# Patient Record
Sex: FEMALE | Race: WHITE | NOT HISPANIC OR LATINO | Employment: FULL TIME | URBAN - METROPOLITAN AREA
[De-identification: names, ages, dates, MRNs, and addresses within clinical notes are randomized per-mention and may not be internally consistent; named-entity substitution may affect disease eponyms.]

---

## 2024-05-24 ENCOUNTER — APPOINTMENT (OUTPATIENT)
Dept: CARDIOLOGY | Facility: HOSPITAL | Age: 29
DRG: 885 | End: 2024-05-24
Payer: COMMERCIAL

## 2024-05-24 ENCOUNTER — HOSPITAL ENCOUNTER (EMERGENCY)
Facility: HOSPITAL | Age: 29
Discharge: PSYCHIATRIC OR SUBSTANCE USE FACILITY | End: 2024-05-25
Attending: STUDENT IN AN ORGANIZED HEALTH CARE EDUCATION/TRAINING PROGRAM
Payer: COMMERCIAL

## 2024-05-24 DIAGNOSIS — R45.89 SUICIDAL BEHAVIOR WITHOUT ATTEMPTED SELF-INJURY: Primary | ICD-10-CM

## 2024-05-24 LAB
ALBUMIN SERPL BCP-MCNC: 4.6 G/DL (ref 3.4–5)
ALP SERPL-CCNC: 32 U/L (ref 33–110)
ALT SERPL W P-5'-P-CCNC: 9 U/L (ref 7–45)
AMPHETAMINES UR QL SCN: NORMAL
ANION GAP SERPL CALC-SCNC: 14 MMOL/L (ref 10–20)
APAP SERPL-MCNC: <10 UG/ML
APPEARANCE UR: CLEAR
AST SERPL W P-5'-P-CCNC: 11 U/L (ref 9–39)
BARBITURATES UR QL SCN: NORMAL
BASOPHILS # BLD AUTO: 0.03 X10*3/UL (ref 0–0.1)
BASOPHILS NFR BLD AUTO: 0.4 %
BENZODIAZ UR QL SCN: NORMAL
BILIRUB SERPL-MCNC: 0.4 MG/DL (ref 0–1.2)
BILIRUB UR STRIP.AUTO-MCNC: NEGATIVE MG/DL
BUN SERPL-MCNC: 11 MG/DL (ref 6–23)
BZE UR QL SCN: NORMAL
CALCIUM SERPL-MCNC: 9.5 MG/DL (ref 8.6–10.3)
CANNABINOIDS UR QL SCN: NORMAL
CHLORIDE SERPL-SCNC: 106 MMOL/L (ref 98–107)
CO2 SERPL-SCNC: 26 MMOL/L (ref 21–32)
COLOR UR: NORMAL
CREAT SERPL-MCNC: 0.97 MG/DL (ref 0.5–1.05)
EGFRCR SERPLBLD CKD-EPI 2021: 81 ML/MIN/1.73M*2
EOSINOPHIL # BLD AUTO: 0.13 X10*3/UL (ref 0–0.7)
EOSINOPHIL NFR BLD AUTO: 1.9 %
ERYTHROCYTE [DISTWIDTH] IN BLOOD BY AUTOMATED COUNT: 12.2 % (ref 11.5–14.5)
ETHANOL SERPL-MCNC: <10 MG/DL
FENTANYL+NORFENTANYL UR QL SCN: NORMAL
GLUCOSE SERPL-MCNC: 100 MG/DL (ref 74–99)
GLUCOSE UR STRIP.AUTO-MCNC: NORMAL MG/DL
HCG UR QL IA.RAPID: NEGATIVE
HCT VFR BLD AUTO: 43.1 % (ref 36–46)
HGB BLD-MCNC: 14.5 G/DL (ref 12–16)
IMM GRANULOCYTES # BLD AUTO: 0.01 X10*3/UL (ref 0–0.7)
IMM GRANULOCYTES NFR BLD AUTO: 0.1 % (ref 0–0.9)
KETONES UR STRIP.AUTO-MCNC: NEGATIVE MG/DL
LEUKOCYTE ESTERASE UR QL STRIP.AUTO: NEGATIVE
LYMPHOCYTES # BLD AUTO: 1.66 X10*3/UL (ref 1.2–4.8)
LYMPHOCYTES NFR BLD AUTO: 24.7 %
MCH RBC QN AUTO: 30.6 PG (ref 26–34)
MCHC RBC AUTO-ENTMCNC: 33.6 G/DL (ref 32–36)
MCV RBC AUTO: 91 FL (ref 80–100)
METHADONE UR QL SCN: NORMAL
MONOCYTES # BLD AUTO: 0.51 X10*3/UL (ref 0.1–1)
MONOCYTES NFR BLD AUTO: 7.6 %
NEUTROPHILS # BLD AUTO: 4.38 X10*3/UL (ref 1.2–7.7)
NEUTROPHILS NFR BLD AUTO: 65.3 %
NITRITE UR QL STRIP.AUTO: NEGATIVE
NRBC BLD-RTO: 0 /100 WBCS (ref 0–0)
OPIATES UR QL SCN: NORMAL
OXYCODONE+OXYMORPHONE UR QL SCN: NORMAL
PCP UR QL SCN: NORMAL
PH UR STRIP.AUTO: 6.5 [PH]
PLATELET # BLD AUTO: 223 X10*3/UL (ref 150–450)
POTASSIUM SERPL-SCNC: 3.5 MMOL/L (ref 3.5–5.3)
PROT SERPL-MCNC: 7.2 G/DL (ref 6.4–8.2)
PROT UR STRIP.AUTO-MCNC: NEGATIVE MG/DL
RBC # BLD AUTO: 4.74 X10*6/UL (ref 4–5.2)
RBC # UR STRIP.AUTO: NEGATIVE /UL
SALICYLATES SERPL-MCNC: <3 MG/DL
SODIUM SERPL-SCNC: 142 MMOL/L (ref 136–145)
SP GR UR STRIP.AUTO: 1.02
TSH SERPL-ACNC: 0.5 MIU/L (ref 0.44–3.98)
UROBILINOGEN UR STRIP.AUTO-MCNC: NORMAL MG/DL
WBC # BLD AUTO: 6.7 X10*3/UL (ref 4.4–11.3)

## 2024-05-24 PROCEDURE — 85025 COMPLETE CBC W/AUTO DIFF WBC: CPT | Performed by: PHYSICIAN ASSISTANT

## 2024-05-24 PROCEDURE — 84443 ASSAY THYROID STIM HORMONE: CPT | Performed by: PHYSICIAN ASSISTANT

## 2024-05-24 PROCEDURE — 80307 DRUG TEST PRSMV CHEM ANLYZR: CPT | Performed by: PHYSICIAN ASSISTANT

## 2024-05-24 PROCEDURE — 81003 URINALYSIS AUTO W/O SCOPE: CPT | Mod: 59 | Performed by: PHYSICIAN ASSISTANT

## 2024-05-24 PROCEDURE — 93005 ELECTROCARDIOGRAM TRACING: CPT

## 2024-05-24 PROCEDURE — 36415 COLL VENOUS BLD VENIPUNCTURE: CPT | Performed by: PHYSICIAN ASSISTANT

## 2024-05-24 PROCEDURE — 80320 DRUG SCREEN QUANTALCOHOLS: CPT | Performed by: PHYSICIAN ASSISTANT

## 2024-05-24 PROCEDURE — 99285 EMERGENCY DEPT VISIT HI MDM: CPT

## 2024-05-24 PROCEDURE — 80053 COMPREHEN METABOLIC PANEL: CPT | Performed by: PHYSICIAN ASSISTANT

## 2024-05-24 PROCEDURE — 81025 URINE PREGNANCY TEST: CPT | Performed by: PHYSICIAN ASSISTANT

## 2024-05-24 SDOH — HEALTH STABILITY: MENTAL HEALTH: WISH TO BE DEAD (PAST 1 MONTH): YES

## 2024-05-24 SDOH — HEALTH STABILITY: MENTAL HEALTH: ACTIVE SUICIDAL IDEATION WITH SPECIFIC PLAN AND INTENT (PAST 1 MONTH): NO

## 2024-05-24 SDOH — HEALTH STABILITY: MENTAL HEALTH: ANXIETY SYMPTOMS: GENERALIZED

## 2024-05-24 SDOH — HEALTH STABILITY: MENTAL HEALTH: SUICIDAL BEHAVIOR (LIFETIME): YES

## 2024-05-24 SDOH — HEALTH STABILITY: MENTAL HEALTH: ARE YOU HAVING THOUGHTS OF KILLING YOURSELF RIGHT NOW?: NO

## 2024-05-24 SDOH — HEALTH STABILITY: MENTAL HEALTH: IN THE PAST FEW WEEKS, HAVE YOU WISHED YOU WERE DEAD?: YES

## 2024-05-24 SDOH — HEALTH STABILITY: MENTAL HEALTH: HAVE YOU BEEN THINKING ABOUT HOW YOU MIGHT DO THIS?: YES

## 2024-05-24 SDOH — HEALTH STABILITY: MENTAL HEALTH: HAVE YOU HAD THESE THOUGHTS AND HAD SOME INTENTION OF ACTING ON THEM?: NO

## 2024-05-24 SDOH — HEALTH STABILITY: MENTAL HEALTH: SUICIDAL BEHAVIOR (3 MONTHS): YES

## 2024-05-24 SDOH — HEALTH STABILITY: MENTAL HEALTH: NEEDS EXPRESSED: EMOTIONAL

## 2024-05-24 SDOH — HEALTH STABILITY: MENTAL HEALTH: HAVE YOU ACTUALLY HAD ANY THOUGHTS OF KILLING YOURSELF?: YES

## 2024-05-24 SDOH — HEALTH STABILITY: MENTAL HEALTH: NON-SPECIFIC ACTIVE SUICIDAL THOUGHTS (PAST 1 MONTH): YES

## 2024-05-24 SDOH — HEALTH STABILITY: MENTAL HEALTH: HAVE YOU EVER TRIED TO KILL YOURSELF?: NO

## 2024-05-24 SDOH — HEALTH STABILITY: MENTAL HEALTH: SUICIDE ASSESSMENT: ADULT (C-SSRS)

## 2024-05-24 SDOH — ECONOMIC STABILITY: HOUSING INSECURITY: FEELS SAFE LIVING IN HOME: YES

## 2024-05-24 SDOH — HEALTH STABILITY: MENTAL HEALTH: HAVE YOU EVER DONE ANYTHING, STARTED TO DO ANYTHING, OR PREPARED TO DO ANYTHING TO END YOUR LIFE?: NO

## 2024-05-24 SDOH — HEALTH STABILITY: MENTAL HEALTH
DEPRESSION SYMPTOMS: APPETITE CHANGE;CHANGE IN ENERGY LEVEL;FEELINGS OF HELPLESSNESS;FEELINGS OF HOPELESSESS;FEELINGS OF WORTHLESSNESS;IMPAIRED CONCENTRATION;LOSS OF INTEREST

## 2024-05-24 SDOH — HEALTH STABILITY: MENTAL HEALTH: IN THE PAST FEW WEEKS, HAVE YOU FELT THAT YOU OR YOUR FAMILY WOULD BE BETTER OFF IF YOU WERE DEAD?: YES

## 2024-05-24 SDOH — HEALTH STABILITY: MENTAL HEALTH
HAVE YOU STARTED TO WORK OUT OR WORKED OUT THE DETAILS OF HOW TO KILL YOURSELF? DO YOU INTENT TO CARRY OUT THIS PLAN?: NO

## 2024-05-24 SDOH — HEALTH STABILITY: MENTAL HEALTH: ACTIVE SUICIDAL IDEATION WITH SOME INTENT TO ACT, WITHOUT SPECIFIC PLAN (PAST 1 MONTH): NO

## 2024-05-24 SDOH — HEALTH STABILITY: MENTAL HEALTH: IN THE PAST WEEK, HAVE YOU BEEN HAVING THOUGHTS ABOUT KILLING YOURSELF?: YES

## 2024-05-24 SDOH — HEALTH STABILITY: MENTAL HEALTH
SUICIDAL BEHAVIOR (DESCRIPTION): PATIENT REPORTED THAT SHE TOOK TOO MANY TABLETS OF HER MEDICATION "BECAUSE I WANTED TO SLEEP"

## 2024-05-24 SDOH — HEALTH STABILITY: MENTAL HEALTH: HAVE YOU WISHED YOU WERE DEAD OR WISHED YOU COULD GO TO SLEEP AND NOT WAKE UP?: YES

## 2024-05-24 SDOH — SOCIAL STABILITY: SOCIAL NETWORK: PARENT/GUARDIAN/SIGNIFICANT OTHER INVOLVEMENT: ATTENTIVE TO PATIENT NEEDS

## 2024-05-24 ASSESSMENT — LIFESTYLE VARIABLES
PRESCIPTION_ABUSE_PAST_12_MONTHS: NO
SUBSTANCE_ABUSE_PAST_12_MONTHS: NO

## 2024-05-24 ASSESSMENT — PAIN SCALES - GENERAL: PAINLEVEL_OUTOF10: 0 - NO PAIN

## 2024-05-24 ASSESSMENT — PAIN - FUNCTIONAL ASSESSMENT: PAIN_FUNCTIONAL_ASSESSMENT: 0-10

## 2024-05-24 NOTE — ED PROVIDER NOTES
HPI   Chief Complaint   Patient presents with    Psychiatric Evaluation     Patient with history of psychiatric placement (placed in [place] for 10 days back in April) reports depression and thoughts of suicide that she has had before but have increased in the past week. No attempts recently. Accidental overdose was 2 months ago. No plan to kill herself but has identified a method, which would be an overdose with her lexapro/risperidone. Diagnosed with depression, anxiety, OCD. Possible Borderline PD. Patient has psychiatrist in [place]. Denies self-harm.       This is a 28-year-old female with PMH anxiety depression presenting for evaluation of depression and suicidal thoughts.  States she is in town visiting family.  Had a psychiatric hospitalization in [place] in April.  She reports passive suicidal thoughts of wishing she would not be around anymore and occasionally considers overdosing on her psychiatric medications but states she has no definitive plan to do so.  She has accidentally overdosed in the past when she took more medicine she was post to because she was trying to go to sleep.  She uses sleep as a way to escape being awake she says.  Denies any current plans for self-harm.  Denies HI.  Denies auditory visual hallucinations.      History provided by:  Patient   used: No                        [place] Coma Scale Score: 15                     Patient History   No past medical history on file.  No past surgical history on file.  No family history on file.  Social History     Tobacco Use    Smoking status: Not on file    Smokeless tobacco: Not on file   Substance Use Topics    Alcohol use: Not on file    Drug use: Not on file       Physical Exam   ED Triage Vitals [05/24/24 1620]   Temperature Heart Rate Respirations BP   36.8 °C (98.2 °F) 83 16 105/71      Pulse Ox Temp Source Heart Rate Source Patient Position   98 % Temporal Monitor Sitting      BP Location FiO2 (%)     Left arm  --       Physical Exam  Constitutional:       Appearance: Normal appearance.   Eyes:      Extraocular Movements: Extraocular movements intact.      Pupils: Pupils are equal, round, and reactive to light.   Cardiovascular:      Rate and Rhythm: Normal rate and regular rhythm.      Pulses: Normal pulses.      Heart sounds: Normal heart sounds.   Pulmonary:      Effort: Pulmonary effort is normal.      Breath sounds: Normal breath sounds.   Abdominal:      Palpations: Abdomen is soft.      Tenderness: There is no abdominal tenderness.   Neurological:      Mental Status: She is alert and oriented to person, place, and time.   Psychiatric:         Mood and Affect: Mood normal.         Behavior: Behavior normal.         ED Course & MDM        Medical Decision Making  Patient is currently calm and cooperative and not acutely psychotic.  Currently medically cleared, there is no medical contraindication to psychiatric evaluation at this time.  Pending EPAT evaluation.  This visit was staffed with the attending physician Dr. Mcintyre.      Disclaimer: This note was dictated using speech recognition software. An attempt at proofreading was made to minimize errors. Minor errors in transcription may be present. Please call if questions.    Amount and/or Complexity of Data Reviewed  Labs: ordered.  ECG/medicine tests: ordered and independent interpretation performed.     Details: EKG interpreted by me: Normal sinus rhythm with sinus arrhythmia.  Rate 65.  Normal axis.  QTc 411 ms.  No acute T wave changes.  No STEMI.        Procedure  Procedures     Rojelio Urbina PA-C  05/24/24 6023

## 2024-05-24 NOTE — ED TRIAGE NOTES
Patient with history of psychiatric placement (placed in [place] for 10 days back in April) reports depression and thoughts of suicide that she has had before but have increased in the past week. No attempts recently. Accidental overdose was 2 months ago. No plan to kill herself but has identified a method, which would be an overdose with her lexapro/risperidone. Diagnosed with depression, anxiety, OCD. Possible Borderline PD. Patient has psychiatrist in [place]. Denies self-harm. Currently staying with parents, mother brought her here today.

## 2024-05-25 ENCOUNTER — HOSPITAL ENCOUNTER (INPATIENT)
Facility: HOSPITAL | Age: 29
LOS: 16 days | Discharge: HOME | DRG: 885 | End: 2024-06-10
Attending: PSYCHIATRY & NEUROLOGY | Admitting: PSYCHIATRY & NEUROLOGY
Payer: COMMERCIAL

## 2024-05-25 VITALS
HEIGHT: 70 IN | SYSTOLIC BLOOD PRESSURE: 101 MMHG | BODY MASS INDEX: 22.9 KG/M2 | TEMPERATURE: 97.3 F | HEART RATE: 62 BPM | OXYGEN SATURATION: 98 % | WEIGHT: 160 LBS | RESPIRATION RATE: 17 BRPM | DIASTOLIC BLOOD PRESSURE: 67 MMHG

## 2024-05-25 DIAGNOSIS — F33.2 SEVERE EPISODE OF RECURRENT MAJOR DEPRESSIVE DISORDER, WITHOUT PSYCHOTIC FEATURES (MULTI): Primary | ICD-10-CM

## 2024-05-25 PROBLEM — F33.9 MAJOR DEPRESSIVE DISORDER, RECURRENT (CMS-HCC): Status: ACTIVE | Noted: 2024-05-25

## 2024-05-25 LAB
CHOLEST SERPL-MCNC: 165 MG/DL (ref 0–199)
CHOLESTEROL/HDL RATIO: 4.2
FLUAV RNA RESP QL NAA+PROBE: NOT DETECTED
FLUBV RNA RESP QL NAA+PROBE: NOT DETECTED
GLUCOSE P FAST SERPL-MCNC: 84 MG/DL (ref 74–99)
HDLC SERPL-MCNC: 39 MG/DL
HOLD SPECIMEN: NORMAL
LDLC SERPL CALC-MCNC: 107 MG/DL
NON HDL CHOLESTEROL: 126 MG/DL (ref 0–149)
SARS-COV-2 RNA RESP QL NAA+PROBE: NOT DETECTED
TRIGL SERPL-MCNC: 96 MG/DL (ref 0–149)
VLDL: 19 MG/DL (ref 0–40)

## 2024-05-25 PROCEDURE — 82947 ASSAY GLUCOSE BLOOD QUANT: CPT | Performed by: PSYCHIATRY & NEUROLOGY

## 2024-05-25 PROCEDURE — 36415 COLL VENOUS BLD VENIPUNCTURE: CPT | Performed by: PSYCHIATRY & NEUROLOGY

## 2024-05-25 PROCEDURE — 2500000001 HC RX 250 WO HCPCS SELF ADMINISTERED DRUGS (ALT 637 FOR MEDICARE OP): Performed by: PSYCHIATRY & NEUROLOGY

## 2024-05-25 PROCEDURE — 2500000002 HC RX 250 W HCPCS SELF ADMINISTERED DRUGS (ALT 637 FOR MEDICARE OP, ALT 636 FOR OP/ED): Performed by: PSYCHIATRY & NEUROLOGY

## 2024-05-25 PROCEDURE — 87636 SARSCOV2 & INF A&B AMP PRB: CPT | Performed by: STUDENT IN AN ORGANIZED HEALTH CARE EDUCATION/TRAINING PROGRAM

## 2024-05-25 PROCEDURE — 80061 LIPID PANEL: CPT | Performed by: PSYCHIATRY & NEUROLOGY

## 2024-05-25 PROCEDURE — 1240000001 HC SEMI-PRIVATE BH ROOM DAILY

## 2024-05-25 PROCEDURE — 99221 1ST HOSP IP/OBS SF/LOW 40: CPT | Performed by: NURSE PRACTITIONER

## 2024-05-25 PROCEDURE — 2500000001 HC RX 250 WO HCPCS SELF ADMINISTERED DRUGS (ALT 637 FOR MEDICARE OP): Performed by: NURSE PRACTITIONER

## 2024-05-25 PROCEDURE — 99223 1ST HOSP IP/OBS HIGH 75: CPT | Performed by: PSYCHIATRY & NEUROLOGY

## 2024-05-25 RX ORDER — ESCITALOPRAM OXALATE 20 MG/1
40 TABLET ORAL DAILY
COMMUNITY
End: 2024-06-10 | Stop reason: HOSPADM

## 2024-05-25 RX ORDER — CLONIDINE HYDROCHLORIDE 0.1 MG/1
0.1 TABLET ORAL NIGHTLY
COMMUNITY
End: 2024-06-10 | Stop reason: HOSPADM

## 2024-05-25 RX ORDER — NORETHINDRONE ACETATE AND ETHINYL ESTRADIOL 1MG-20(21)
1 KIT ORAL DAILY
COMMUNITY

## 2024-05-25 RX ORDER — OLANZAPINE 5 MG/1
5 TABLET ORAL NIGHTLY
Status: DISCONTINUED | OUTPATIENT
Start: 2024-05-25 | End: 2024-05-30

## 2024-05-25 RX ORDER — FLUVOXAMINE MALEATE 50 MG/1
25 TABLET, FILM COATED ORAL 2 TIMES DAILY
Status: DISCONTINUED | OUTPATIENT
Start: 2024-05-25 | End: 2024-05-30

## 2024-05-25 RX ORDER — TRAZODONE HYDROCHLORIDE 50 MG/1
50 TABLET ORAL NIGHTLY
COMMUNITY
End: 2024-06-10 | Stop reason: HOSPADM

## 2024-05-25 RX ORDER — MIRTAZAPINE 15 MG/1
15 TABLET, FILM COATED ORAL NIGHTLY
COMMUNITY
End: 2024-06-10 | Stop reason: HOSPADM

## 2024-05-25 RX ORDER — ACETAMINOPHEN 325 MG/1
650 TABLET ORAL EVERY 4 HOURS PRN
Status: DISCONTINUED | OUTPATIENT
Start: 2024-05-25 | End: 2024-06-10 | Stop reason: HOSPADM

## 2024-05-25 RX ORDER — RISPERIDONE 0.5 MG/1
0.5 TABLET ORAL 2 TIMES DAILY
COMMUNITY
End: 2024-06-10 | Stop reason: HOSPADM

## 2024-05-25 RX ORDER — BACITRACIN ZINC 500 UNIT/G
1 OINTMENT IN PACKET (EA) TOPICAL 3 TIMES DAILY
Status: DISCONTINUED | OUTPATIENT
Start: 2024-05-25 | End: 2024-06-10 | Stop reason: HOSPADM

## 2024-05-25 RX ORDER — LORAZEPAM 1 MG/1
1 TABLET ORAL EVERY 6 HOURS PRN
Status: DISCONTINUED | OUTPATIENT
Start: 2024-05-25 | End: 2024-05-29

## 2024-05-25 RX ORDER — NORETHINDRONE ACETATE AND ETHINYL ESTRADIOL 1MG-20(21)
1 KIT ORAL DAILY
Status: DISCONTINUED | OUTPATIENT
Start: 2024-05-25 | End: 2024-06-10 | Stop reason: HOSPADM

## 2024-05-25 RX ORDER — DOCUSATE SODIUM 100 MG/1
100 CAPSULE, LIQUID FILLED ORAL 2 TIMES DAILY PRN
Status: DISCONTINUED | OUTPATIENT
Start: 2024-05-25 | End: 2024-06-10 | Stop reason: HOSPADM

## 2024-05-25 RX ADMIN — ACETAMINOPHEN 650 MG: 325 TABLET ORAL at 20:24

## 2024-05-25 RX ADMIN — BACITRACIN 1 APPLICATION: 500 OINTMENT TOPICAL at 20:24

## 2024-05-25 RX ADMIN — BACITRACIN 1 APPLICATION: 500 OINTMENT TOPICAL at 17:24

## 2024-05-25 RX ADMIN — OLANZAPINE 5 MG: 5 TABLET, FILM COATED ORAL at 20:24

## 2024-05-25 RX ADMIN — FLUVOXAMINE MALEATE 25 MG: 50 TABLET ORAL at 14:59

## 2024-05-25 RX ADMIN — FLUVOXAMINE MALEATE 25 MG: 50 TABLET ORAL at 20:24

## 2024-05-25 SDOH — SOCIAL STABILITY: SOCIAL INSECURITY: ABUSE: ADULT

## 2024-05-25 SDOH — SOCIAL STABILITY: SOCIAL INSECURITY: ARE THERE ANY APPARENT SIGNS OF INJURIES/BEHAVIORS THAT COULD BE RELATED TO ABUSE/NEGLECT?: NO

## 2024-05-25 SDOH — SOCIAL STABILITY: SOCIAL INSECURITY: DO YOU FEEL UNSAFE GOING BACK TO THE PLACE WHERE YOU ARE LIVING?: NO

## 2024-05-25 SDOH — SOCIAL STABILITY: SOCIAL INSECURITY: HAS ANYONE EVER THREATENED TO HURT YOUR FAMILY OR YOUR PETS?: NO

## 2024-05-25 SDOH — SOCIAL STABILITY: SOCIAL INSECURITY: HAVE YOU HAD ANY THOUGHTS OF HARMING ANYONE ELSE?: NO

## 2024-05-25 SDOH — SOCIAL STABILITY: SOCIAL INSECURITY: HAVE YOU HAD THOUGHTS OF HARMING ANYONE ELSE?: NO

## 2024-05-25 SDOH — SOCIAL STABILITY: SOCIAL INSECURITY: DOES ANYONE TRY TO KEEP YOU FROM HAVING/CONTACTING OTHER FRIENDS OR DOING THINGS OUTSIDE YOUR HOME?: NO

## 2024-05-25 SDOH — SOCIAL STABILITY: SOCIAL INSECURITY: WERE YOU ABLE TO COMPLETE ALL THE BEHAVIORAL HEALTH SCREENINGS?: YES

## 2024-05-25 SDOH — SOCIAL STABILITY: SOCIAL INSECURITY: ARE YOU OR HAVE YOU BEEN THREATENED OR ABUSED PHYSICALLY, EMOTIONALLY, OR SEXUALLY BY ANYONE?: NO

## 2024-05-25 SDOH — SOCIAL STABILITY: SOCIAL INSECURITY: DO YOU FEEL ANYONE HAS EXPLOITED OR TAKEN ADVANTAGE OF YOU FINANCIALLY OR OF YOUR PERSONAL PROPERTY?: NO

## 2024-05-25 SDOH — HEALTH STABILITY: MENTAL HEALTH: EXPERIENCED ANY OF THE FOLLOWING LIFE EVENTS: SOCIAL LOSS (BANKRUPTCY, DIVORCE, WORK-RELATED STRESS)

## 2024-05-25 SDOH — HEALTH STABILITY: MENTAL HEALTH: EXPERIENCED ANY OF THE FOLLOWING LIFE EVENTS: OTHER (COMMENT)

## 2024-05-25 ASSESSMENT — LIFESTYLE VARIABLES
HOW OFTEN DO YOU HAVE 6 OR MORE DRINKS ON ONE OCCASION: LESS THAN MONTHLY
AUDIT-C TOTAL SCORE: 0
TREMOR: NO TREMOR
CIWA TOTAL SCORE: 0
HOW MANY STANDARD DRINKS CONTAINING ALCOHOL DO YOU HAVE ON A TYPICAL DAY: PATIENT DOES NOT DRINK
HOW OFTEN DO YOU HAVE A DRINK CONTAINING ALCOHOL: NEVER
ORIENTATION AND CLOUDING OF SENSORIUM: ORIENTED AND CAN DO SERIAL ADDITIONS
HOW MANY STANDARD DRINKS CONTAINING ALCOHOL DO YOU HAVE ON A TYPICAL DAY: 1 OR 2
PAROXYSMAL SWEATS: NO SWEAT VISIBLE
PRESCIPTION_ABUSE_PAST_12_MONTHS: NO
AUDITORY DISTURBANCES: NOT PRESENT
SUBSTANCE_ABUSE_PAST_12_MONTHS: NO
HOW OFTEN DO YOU HAVE A DRINK CONTAINING ALCOHOL: MONTHLY OR LESS
AUDIT-C TOTAL SCORE: 2
SKIP TO QUESTIONS 9-10: 1
AUDIT-C TOTAL SCORE: 0
SUBSTANCE_ABUSE_PAST_12_MONTHS: NO
TOTAL_SCORE: 1
PRESCIPTION_ABUSE_PAST_12_MONTHS: NO
HOW OFTEN DO YOU HAVE 6 OR MORE DRINKS ON ONE OCCASION: NEVER
AGITATION: NORMAL ACTIVITY
SKIP TO QUESTIONS 9-10: 0
NAUSEA AND VOMITING: NO NAUSEA AND NO VOMITING
ANXIETY: NO ANXIETY, AT EASE
AUDIT-C TOTAL SCORE: 2
VISUAL DISTURBANCES: NOT PRESENT
HEADACHE, FULLNESS IN HEAD: NOT PRESENT

## 2024-05-25 ASSESSMENT — PAIN - FUNCTIONAL ASSESSMENT
PAIN_FUNCTIONAL_ASSESSMENT: 0-10

## 2024-05-25 ASSESSMENT — ACTIVITIES OF DAILY LIVING (ADL)
WALKS IN HOME: INDEPENDENT
TOILETING: INDEPENDENT
ADEQUATE_TO_COMPLETE_ADL: YES
LACK_OF_TRANSPORTATION: NO
GROOMING: INDEPENDENT
FEEDING YOURSELF: INDEPENDENT
DRESSING YOURSELF: INDEPENDENT
PATIENT'S MEMORY ADEQUATE TO SAFELY COMPLETE DAILY ACTIVITIES?: YES
LACK_OF_TRANSPORTATION: NO
HEARING - RIGHT EAR: FUNCTIONAL
JUDGMENT_ADEQUATE_SAFELY_COMPLETE_DAILY_ACTIVITIES: YES
HEARING - LEFT EAR: FUNCTIONAL
BATHING: INDEPENDENT

## 2024-05-25 ASSESSMENT — COLUMBIA-SUICIDE SEVERITY RATING SCALE - C-SSRS
6. HAVE YOU EVER DONE ANYTHING, STARTED TO DO ANYTHING, OR PREPARED TO DO ANYTHING TO END YOUR LIFE?: NO
2. HAVE YOU ACTUALLY HAD ANY THOUGHTS OF KILLING YOURSELF?: NO
6. HAVE YOU EVER DONE ANYTHING, STARTED TO DO ANYTHING, OR PREPARED TO DO ANYTHING TO END YOUR LIFE?: NO
1. SINCE LAST CONTACT, HAVE YOU WISHED YOU WERE DEAD OR WISHED YOU COULD GO TO SLEEP AND NOT WAKE UP?: NO
2. HAVE YOU ACTUALLY HAD ANY THOUGHTS OF KILLING YOURSELF?: NO
1. SINCE LAST CONTACT, HAVE YOU WISHED YOU WERE DEAD OR WISHED YOU COULD GO TO SLEEP AND NOT WAKE UP?: NO

## 2024-05-25 ASSESSMENT — PAIN SCALES - GENERAL
PAINLEVEL_OUTOF10: 2
PAINLEVEL_OUTOF10: 0 - NO PAIN

## 2024-05-25 ASSESSMENT — ENCOUNTER SYMPTOMS
RESPIRATORY NEGATIVE: 1
NEUROLOGICAL NEGATIVE: 1
AGITATION: 0
GASTROINTESTINAL NEGATIVE: 1
ENDOCRINE NEGATIVE: 1
CARDIOVASCULAR NEGATIVE: 1
CONSTITUTIONAL NEGATIVE: 1
DECREASED CONCENTRATION: 1
MUSCULOSKELETAL NEGATIVE: 1

## 2024-05-25 ASSESSMENT — PAIN DESCRIPTION - LOCATION: LOCATION: HEAD

## 2024-05-25 ASSESSMENT — PATIENT HEALTH QUESTIONNAIRE - PHQ9
2. FEELING DOWN, DEPRESSED OR HOPELESS: NEARLY EVERY DAY
2. FEELING DOWN, DEPRESSED OR HOPELESS: NEARLY EVERY DAY
SUM OF ALL RESPONSES TO PHQ9 QUESTIONS 1 & 2: 6
SUM OF ALL RESPONSES TO PHQ9 QUESTIONS 1 & 2: 6
1. LITTLE INTEREST OR PLEASURE IN DOING THINGS: NEARLY EVERY DAY
1. LITTLE INTEREST OR PLEASURE IN DOING THINGS: NEARLY EVERY DAY

## 2024-05-25 NOTE — SIGNIFICANT EVENT
05/25/24 1420   Discharge Planning   Living Arrangements Other (Comment)  ([place]. She is staying here with parents for a month or two.)   Support Systems Parent;Family members   Type of Residence Private residence   Home or Post Acute Services Community services   Patient expects to be discharged to: parents home   Financial Resource Strain   How hard is it for you to pay for the very basics like food, housing, medical care, and heating? Not very   Housing Stability   In the last 12 months, was there a time when you were not able to pay the mortgage or rent on time? N   In the last 12 months, was there a time when you did not have a steady place to sleep or slept in a shelter (including now)? N   Transportation Needs   In the past 12 months, has lack of transportation kept you from medical appointments or from getting medications? no   In the past 12 months, has lack of transportation kept you from meetings, work, or from getting things needed for daily living? No     Met with pt on unit today to complete psychosocial assessment. Pt is here visiting her parents while trying to stabilize her mental health. [place]. Plan to return home with her parents once stabilized.

## 2024-05-25 NOTE — SIGNIFICANT EVENT
05/25/24 1419   Able to Complete Psychiatric Screening   Were you able to complete all the behavioral health screenings? Yes   Abuse Screen   Abuse Screen Adult   Have you had any thoughts of harming anyone else? No   Are you or have you been threatened or abused physically, emotionally, or sexually by anyone? No   Has anyone ever threatened to hurt your family or your pets? No   Does anyone try to keep you from having/contacting other friends or doing things outside your home? No   Do you feel UNSAFE going back to the place where you are living? No   Do you feel anyone has exploited or taken advantage of you financially or of your personal property? No   Are there any apparent signs of injuries/behaviors that could be related to abuse/neglect? No   Trauma/Abuse Assessment   Physical Abuse Denies, provider concerned (Comment)   Verbal Abuse Denies, provider concerned (Comment)   Experienced Any of the Following Life Events Social loss (Bankruptcy, divorce, work-related stress)   Drug Screening   Have you used any substances (canabis, cocaine, heroin, hallucinogens, inhalants, etc.) in the past 12 months? No   Have you used any prescription drugs other than prescribed in the past 12 months? No   Is a toxicology screen needed? Yes   Audit Alcohol Screening   Q1: How often do you have a drink containing alcohol? Monthly or l   Q2: How many drinks containing alcohol do you have on a typical day when you are drinking? 1 or 2   Q3: How often do you have six or more drinks on one occasion? Less than mo   Audit-C Score 2   Over the past 2 weeks, how often have you been bothered by any of the following problems?   Little interest or pleasure in doing things Almost all   Feeling down, depressed, or hopeless Almost all   Have you had thoughts of harming anyone else? No   Values/Beliefs   Cultural Requests During Hospitalization n/a   Spiritual Requests During Hospitalization n/a   Patient Strengths/Barriers   Strengths  (Must Choose Two) Support from family;Technical/vocational;Independent living   Barriers Support of organized community   Consults    Consult Needed Yes (Comment)   Spiritual Care Consult Needed No     Inpatient Social Work Psychiatric Assessment     Arrival Details  Mode of Arrival: Ambulatory  Admission Source: ED  Admission Type: Involuntary       History of Present Illness  Admission Reason: SI  (Per EPAT): HPI: Patient is a 27yo  female presenting to the ED due to SI. Patient's provider note and community record was reviewed. Patient's chart hx is limited, as she is a resident of [place], [place]. She reported that she is currently visiting her parents for support as a result of “a breakdown with my mental health.” She endorsed SI with a method of overdosing on her medication, but denied having intent. She was recently hospitalized in April at a psychiatric facility in [place] due to SI and ingesting too much of her medication “to sleep,” denying that this was an attempt of suicide. Patient is currently working with a psychiatrist in [place] and identified that she has been diagnosed with OCD and depression r/o BPD. Patient denied HI/AH/VH.     SW Readmission Information   Readmission within 30 Days: No     Psychiatric Symptoms  Anxiety Symptoms: Generalized  Depression Symptoms: Appetite change, Change in energy level, Feelings of helplessness, Feelings of hopelessness, Feelings of worthlessness, Impaired concentration, Loss of interest  [place] Symptoms: No problems reported or observed.     Psychosis Symptoms  Hallucination Type: No problems reported or observed.  Delusion Type: No problems reported or observed.     Additional Symptoms - Adult  Generalized Anxiety Disorder: Excessive anxiety/worry  Obsessive Compulsive Disorder: Intrusive thoughts, Repetitive thoughts, Ruminatory thoughts  Panic Attack: No problems reported or observed.  Post Traumatic Stress Disorder: No problems  reported or observed.  Delirium: No problems reported or observed.        Current Mental Health Contacts   Name/Phone Number: n/a   Last Appointment Date: n/a  Provider Name/Phone Number: Psychiatry in [place], [place]  Provider Last Appointment Date: Unknown     Support System: Immediate family, Friends     Living Arrangement: House     Home Safety  Feels Safe Living in Home: Yes     Income Information  Employment Status for: Patient  Employment Status: Employed  Income Source: Employed      Service/Education History  Current or Previous  Service: None  Education Level: College  History of Learning Problems: No  History of School Behavior Problems: No     Social/Cultural History  Cultural Requests During Hospitalization: None reported  Spiritual Requests During Hospitalization: None reported     Legal  Legal Considerations: Patient/ Family Ability to Make Healthcare Decisions  Assistance with Managing/Advocating Healthcare Needs:  (Self)  Criminal Activity/ Legal Involvement Pertinent to Current Situation/ Hospitalization: None reported  Legal Concerns: n/a     Drug Screening  Have you used any substances (canabis, cocaine, heroin, hallucinogens, inhalants, etc.) in the past 12 months?: No  Have you used any prescription drugs other than prescribed in the past 12 months?: No  Is a toxicology screen needed?: Yes     Stage of Change  Stage of Change: n/a  History of Treatment:  (n/a)  Treatment Offered:  (n/a)  Duration of Substance Use: Denied use  Frequency of Substance Use: n/a  Age of First Substance Use: n/a     Psychosocial  Affect: Appropriate to circumstances  Parent/Guardian/Significant Other Involvement: No involvement     Orientation  Orientation Level: Oriented X4     General Appearance  Motor Activity: Unremarkable  Speech Pattern:  (Clear, fluid)  General Attitude: Cooperative  Appearance/Hygiene: Unremarkable     Thought Process  Content: Compulsion  Delusions:   "(None elicited)  Perception: Not altered  Hallucination: None  Judgment/Insight: Impaired  Confusion: None  Cognition: Follows commands     Sleep Pattern  Sleep Pattern:  (Patient reported that she feels she has been oversleeping)     Risk Factors  Self Harm/Suicidal Ideation Plan: SI with method of overdosing on her medication  Previous Self Harm/Suicidal Plans: Patient reported: \"I was feeling like I wanted to die, very depressed, and I took too many of my medication but I was just trying to sleep.\"     Violence Risk Assessment  Assessment of Violence: None noted  Thoughts of Harm to Others: No     Ability to Assess Risk Screen  Risk Screen - Ability to Assess: Able to be screened  Ask Suicide-Screening Questions  1. In the past few weeks, have you wished you were dead?: Yes  2. In the past few weeks, have you felt that you or your family would be better off if you were dead?: Yes  3. In the past week, have you been having thoughts about killing yourself?: Yes  4. Have you ever tried to kill yourself?: No  5. Are you having thoughts of killing yourself right now?: No  Calculated Risk Score: Potential Risk  [place] Suicide Severity Rating Scale (Screener/Recent Self-Report)  1. Wish to be Dead (Past 1 Month): Yes  2. Non-Specific Active Suicidal Thoughts (Past 1 Month): Yes  3. Active Suicidal Ideation with any Methods (Not Plan) Without Intent to Act (Past 1 Month): Yes  4. Active Suicidal Ideation with Some Intent to Act, Without Specific Plan (Past 1 Month): No  5. Active Suicidal Ideation with Specific Plan and Intent (Past 1 Month): No  6. Suicidal Behavior (Lifetime): Yes  6. Suicidal Behavior (3 Months): Yes  6. Suicidal Behavior (Description): Patient reported that she took too many tablets of her medication \"because I wanted to sleep\"  Calculated C-SSRS Risk Score (Lifetime/Recent): High Risk  Step 1: Risk Factors  Current & Past Psychiatric Dx: Mood disorder  Presenting Symptoms: Anhedonia, Hopelessness " or despair, Anxiety and/or panic  Precipitants/Stressors: Triggering events leading to humiliation, shame, and/or despair (e.g. loss of relationship, financial or health status) (real or anticipated)  Access to Lethal Methods :  (Denied)  Step 2: Protective Factors   Protective Factors Internal: Identifies reasons for living  Protective Factors External: Supportive social network or family or friends  Step 3: Suicidal Ideation Intensity  Most Severe Suicidal Ideation Identified: SI with method of overdosing on medication  How Many Times Have You Had These Thoughts: 2-5 times in a week  When You Have the Thoughts How Long do They Last : 1-4 hours/a lot of the time  Could/Can You Stop Thinking About Killing Yourself or Wanting to Die if You Want to: Can control thoughts with a lot of difficulty  Are There Things - Anyone or Anything - That Stopped You From Wanting to Die or Acting on: Uncertain that deterrents stopped you  What Sort of Reasons Did You Have For Thinking About Wanting to Die or Killing Yourself: Mostly to end or stop the pain (you couldn't go on living with the pain or how you were feeling)  Total Score: 17  Step 5: Documentation  Risk Level: High suicide risk     Psychiatric Impression and Plan of Care     Assessment and Plan:      Ruthy is a 28 y.o CF admitted to [facility] for SI. Pt lives in [place], [place], but came here to be with her parents after suffering from depression and SA at home. She [place] psychiatrically [place] in [place]. Today, pt reports increasing depression over lat 1.5 weeks. She reports she was initially dx with COLLIN and depression at age 18. She has been taking medications for years and was seeing a Psychiatrist and therapist OP in [place]. About 5-6 months ago she found out that her SO cheated on her. Her depression and feelings of worthlessness have been increasing ever since then. She reports prior to coming to [place] she was participating in an IOP. She is on a leave from work to  address her worsening mental health sx. She is help seeking. SW briefly met her mother before she left for visitation. Pt and mother have concerns about insurance coverage for this hospitalization due to out of state. Pt signed a SIL to speak with either mom or dad. Pt also signed a voluntary application for admission. She spoke with MD and came up with a plan for medication changes, but verbalizes worry about these changes and side effects. Pt has decent insight.    Stabilize; medications per MD order  Milieu therapy  OT consult  Link with therapy/psychiatry as appropriate  Obtain collateral  DC home

## 2024-05-25 NOTE — CARE PLAN
Problem: Discharge Planning - Care Management  Goal: Discharge to post-acute care or home with appropriate resources  Outcome: Progressing     Problem: Access to Care Issue  Goal: Assess and Address Access Barriers  Outcome: Progressing     Problem: Assessment of Caregiver  Goal: Caregiver Demonstrates Personal Health and Wellbeing; as well as Ability to Safely and Effectively Care for Patient  Outcome: Progressing     Problem: Assistance Required for Daily Activities  Goal: Develop a Plan to Assist Patient with Activites of Daily Living  Outcome: Progressing     Problem: Coordination of Community Resources Needed  Goal: Coordination of Services will be Obtained  Outcome: Progressing     Problem: Coordination of Psychosocial Support Services Needed  Goal: Coordination of Services will be Obtained  Outcome: Progressing     Problem: Medication Adherence  Goal: Adherence to Medication Regimen  Outcome: Progressing     Problem: Financial Problem  Goal: Assess and Address Specific Financial Obstacles Affecting Patient's Adderence to Plan of Care  Outcome: Progressing  Goal: STG - Patient will complete simple calculations for time/money management  Outcome: Progressing     Problem: Risk of Exacerbation, or Readmission to Hospital Related to Symptoms of Comorbidities  Goal: Knowledge and Symptom Management of Chronic Disease will be Documented  Outcome: Progressing     Problem: Risk of Uncoordinated Care  Goal: Care will be Coordinated and Supported by a Multidisciplinary Team of Providers  Outcome: Progressing     Problem: Negative Experience, Conflict with, or Distrust of Providers and/or Health System  Goal: Plan to Address Patient Specific Negative Experience, Distrust, or Conflict with Providers and/or Health System  Outcome: Progressing

## 2024-05-25 NOTE — GROUP NOTE
Group Topic: Cognitive Focus   Group Date: 5/25/2024  Start Time: 1100  End Time: 1150  Facilitators: DIANDRA TrujilloS   Department: [facility]    Number of Participants: 8   Group Focus: concentration, leisure skills, and social skills  Treatment Modality: Recreation Therapy  Interventions utilized were leisure development  Purpose: coping skills and communication skills    Name: Ruthy Mcrae YOB: 1995   MR: [id number]      Facilitator: Recreational Therapist  Level of Participation: did not attend  Progress: None  Comments: Patients engaged in a socially enriching experience of Mr. Butler, designed to foster cognitive skills and camaraderie. Patients are presented with thought provoking topics which encourage strategic thinking and collaborative engagement. Patients were able to showcase their knowledge, creativity, and teamwork in a supportive environment. Patient declined invitation to group activity at this time. Patient will continue to be provided with opportunities to enhance leisure skills and/or coping mechanisms.  Plan: continue with services

## 2024-05-25 NOTE — GROUP NOTE
Group Topic: Art Creative   Group Date: 5/25/2024  Start Time: 1400  End Time: 1515  Facilitators: DIANDRA TrujilloS   Department: [facility] THERAP[facility]IRT[facility]    Number of Participants: 8   Group Focus: art therapy, coping skills, and relaxation  Treatment Modality: Recreation Therapy  Interventions utilized were leisure development  Purpose: coping skills    Name: Ruthy Mcrae YOB: 1995   MR: [id number]      Facilitator: Recreational Therapist  Level of Participation: did not attend  Progress: None  Comments: Patients participated in art tasks as an avenue of self-expression and emotional release. Through the creative process, patients explored and communicated complex feelings, fostering a sense of control, empowerment, and self-discovery. Engaging in the task provided a cathartic outlet, promoting relaxation, stress reduction, and the cultivation of coping skills to enhance emotional well-being and encouraging social connection. Patient declined invitation to group activity at this time. Patient will continue to be provided with opportunities to enhance leisure skills and/or coping mechanisms.   Plan: continue with services

## 2024-05-25 NOTE — LETTER
"     Inpatient Treatment Plan 24   Effective from: 2024  Effective to: 2024    Plan ID: [id number]                Participants as of Finalize on 2024      Name Type Comments Contact Info    Yesika Bobby LCSW       Brooklyn Mora MD PhD Attending Provider  [phone]    Ruthy Mcrae Patient  [phone]           Patient Demographics       Patient Name  Ruthy Mcrae Legal Sex  Female   1995 [facility]   Address  [address] [place] [id number] Phone  [phone] (Home) *Preferred*  [phone] (Mobile)           Treatment Plan Assessment       Yesika Bobby LCSW Last edited by Yesika Bobby LCSW on 2024  1:04 PM EDT         Treatment Plan Review  Date: 2024   Time: 1:01 PM       Patient Name:  Ruthy Mcrae       Medical Record Number: [id number]   YOB: 1995  Sex: Female            Admit Date/Time:  2024  8:00 AM  Treatment Team:   Brooklyn Mora MD PhD  HUMBERTO Nieves MD Tonya F Lee, APRN-CNP    Summary: Ruthy is a 28 y.o. CF admitted to [facility] for SI. Pt lives in [place], [place] and reportedly came to [place] to be with her parents due to suffering a \"mental breakdown\". She endorsed SI with a method of overdosing on her medication, but denied having intent. She was recently hospitalized in April at a psychiatric facility in [place] due to SI and ingesting too much of her medication “to sleep,” denying that this was an attempt of suicide. Patient is currently working with a psychiatrist in [place] and identified that she has been diagnosed with OCD and depression r/o BPD. Patient denied HI/AH/VH.       Expected Discharge Date: 2024    Discharge Plan: return to previous living arrangement; community mental health    Treatment Plan Created/Updated By: Yesika Bobby LCSW          Patient Strengths and Barriers       Strengths (Must Choose Two)        105  " Support from family;Independent living              Barriers       05/25 1050  Other (Comment)                   Current Problems                Noted    Major depressive disorder, recurrent (CMS-[facility]) 5/25/2024           Current Medications          Start End    cloNIDine (Catapres) 0.1 mg tablet  --    Sig: Take 1 tablet (0.1 mg) by mouth once daily at bedtime.    Class: Historical Med    Route: oral    escitalopram (Lexapro) 20 mg tablet  --    Sig: Take 2 tablets (40 mg) by mouth once daily.    Class: Historical Med    Route: oral    mirtazapine (Remeron) 15 mg tablet  --    Sig: Take 1 tablet (15 mg) by mouth once daily at bedtime.    Class: Historical Med    Route: oral    risperiDONE (RisperDAL) 0.5 mg tablet  --    Sig: Take 1 tablet (0.5 mg) by mouth 2 times a day.    Class: Historical Med    Route: oral    traZODone (Desyrel) 50 mg tablet  --    Sig: Take 1 tablet (50 mg) by mouth once daily at bedtime.    Class: Historical Med    Route: oral           Hospital Medications          Dose Frequency Start End    acetaminophen (Tylenol) tablet 650 mg 650 mg Every 4 hours PRN 5/25/2024 --    Route: oral    docusate sodium (Colace) capsule 100 mg 100 mg 2 times daily PRN 5/25/2024 --    Admin Instructions: Bowel Regimen - for prevention of constipation  Hold for loose stools    Route: oral    fLuvoxaMINE (Luvox) tablet 25 mg 25 mg 2 times daily 5/25/2024 --    Route: oral    LORazepam (Ativan) tablet 1 mg 1 mg Every 6 hours PRN 5/25/2024 --    Route: oral    OLANZapine (ZyPREXA) tablet 5 mg 5 mg Nightly 5/25/2024 --    Route: oral           Discharge Planning      Flowsheet Row Most Recent Value   Living Arrangements Parent   Support Systems Parent   Assistance Needed Denies   Type of Residence Private residence   Type of Animals or Pets 2 cats   Who is requesting discharge planning? Provider   Home or Post Acute Services Community services   Patient expects to be discharged to: To her parents home.   Does the  patient need discharge transport arranged? Yes   Has discharge transport been arranged? No   What day is the transport expected? 05/31/24   What time is the transport expected? 1300           Care Plan (Active)       Problem: Access to Care Issue       Dates: Start:  05/25/24       Disciplines: Interdisciplinary      Goal: Assess and Address Access Barriers       Dates: Start:  05/25/24       Disciplines: Interdisciplinary      Outcomes       Date/Time User Outcome    05/25/24 1300 Yesika Bobby LCSW Progressing              Intervention: Confirm adherence with follow up care (labs, tests, studies) and scheduled appointments per provider/specialist instruction       Dates: Start:  05/25/24                                 Problem: Alteration in Sleep       Dates: Start:  05/25/24       Disciplines: Interdisciplinary      Goal: STG - Reports nightly sleep, duration, and quality       Dates: Start:  05/25/24       Description: INTERVENTIONS:  1. Document duration, quality of sleep, and reasons for lack    Disciplines: Interdisciplinary              Goal: STG - Identifies sleep hygiene aids       Dates: Start:  05/25/24       Description: INTERVENTIONS:  1. Promote identification & development of sleep hygiene program    Disciplines: Interdisciplinary              Goal: STG - Informs staff if unable to sleep       Dates: Start:  05/25/24       Description: INTERVENTIONS:  1. Encourage patient to inform staff if awake at rounds    Disciplines: Interdisciplinary              Goal: STG - Attends breathing and relaxation group       Dates: Start:  05/25/24       Description: INTERVENTIONS:  1. Encourage attendance at breathing and relaxation group nightly    Disciplines: Interdisciplinary                      Problem: Altered Thought Processes as Evidenced by       Dates: Start:  05/25/24       Disciplines: Interdisciplinary      Goal: STG - Desires improvement in ability to think and concentrate       Dates:  Start:  05/25/24       Disciplines: Interdisciplinary              Goal: STG - Participates in Occupational Therapy and other cognitive assessments       Dates: Start:  05/25/24       Description: INTERVENTIONS:  1. Assess concentration and thinking in groups  2. Assess in 1 to 1 meetings    Disciplines: Interdisciplinary                      Problem: Anxiety       Dates: Start:  05/25/24       Disciplines: Interdisciplinary      Goal: Patient/family understands admission protocols       Dates: Start:  05/25/24       Description: INTERVENTIONS:  1. Explain admission protocols    Disciplines: Interdisciplinary              Goal: Attempts to manage anxiety with help       Dates: Start:  05/25/24       Disciplines: Interdisciplinary              Goal: Verbalizes ways to manage anxiety       Dates: Start:  05/25/24       Disciplines: Interdisciplinary              Goal: Implements measures to reduce anxiety       Dates: Start:  05/25/24       Disciplines: Interdisciplinary              Goal: Free from restraint events       Dates: Start:  05/25/24       Description: INTERVENTIONS:  1. Utilize least restrictive measures  2. Provide behavioral interventions  3. Redirect inappropriate behaviors    Disciplines: Interdisciplinary                      Problem: Assessment of Caregiver       Dates: Start:  05/25/24       Disciplines: Interdisciplinary      Goal: Caregiver Demonstrates Personal Health and Wellbeing; as well as Ability to Safely and Effectively Care for Patient       Dates: Start:  05/25/24       Disciplines: Interdisciplinary      Outcomes       Date/Time User Outcome    05/25/24 1300 Yesika Bobby LCSW Progressing              Intervention: Assess for available supporting caregiver       Dates: Start:  05/25/24                 Intervention: Assess caregiver ability and capacity to perform identified support needs for the patient       Dates: Start:  05/25/24                 Intervention: Develop  caregiver support plan       Dates: Start:  05/25/24                                 Problem: Assistance Required for Daily Activities       Dates: Start:  05/25/24       Disciplines: Interdisciplinary      Goal: Develop a Plan to Assist Patient with Activites of Daily Living       Dates: Start:  05/25/24       Disciplines: Interdisciplinary      Outcomes       Date/Time User Outcome    05/25/24 Bennett Bobby LCSW Progressing              Intervention: Identify  issues in functional status       Dates: Start:  05/25/24                 Intervention: Develop a plan to support activities of daily living       Dates: Start:  05/25/24                                 Problem: Coordination of Community Resources Needed       Dates: Start:  05/25/24       Disciplines: Interdisciplinary, Social Work      Goal: Coordination of Services will be Obtained       Dates: Start:  05/25/24       Disciplines: Interdisciplinary, Social Work      Outcomes       Date/Time User Outcome    05/25/24 Bennett Bobby LCSW Progressing              Intervention: Coordinate care to local community resources       Dates: Start:  05/25/24                                 Problem: Coordination of Psychosocial Support Services Needed       Dates: Start:  05/25/24       Disciplines: Interdisciplinary      Goal: Coordination of Services will be Obtained       Dates: Start:  05/25/24       Disciplines: Interdisciplinary      Outcomes       Date/Time User Outcome    05/25/24 Bennett Bobby LCSW Progressing              Intervention: Collaborate with provider to coordinate referral to psychosocial support services       Dates: Start:  05/25/24                 Intervention: Follow up to confirm engagement/adherence and evaluate support services effectiveness       Dates: Start:  05/25/24                                 Problem: Defensive Coping       Dates: Start:  05/25/24       Disciplines: Interdisciplinary       Goal: Cooperates with admission process       Dates: Start:  05/25/24       Description: INTERVENTIONS:  1. Complete admission process    Disciplines: Interdisciplinary              Goal: Identifies reckless/dangerous behavior       Dates: Start:  05/25/24       Disciplines: Interdisciplinary              Goal: Identifies stressors that lead to reckless/dangerous behavior       Dates: Start:  05/25/24       Disciplines: Interdisciplinary              Goal: Discusses and identifies healthy coping skills       Dates: Start:  05/25/24       Disciplines: Interdisciplinary              Goal: Demonstrates healthy coping skills       Dates: Start:  05/25/24       Disciplines: Interdisciplinary              Goal: Identifies appropriate social interaction       Dates: Start:  05/25/24       Disciplines: Interdisciplinary              Goal: Demonstrates appropriate social interactions       Dates: Start:  05/25/24       Disciplines: Interdisciplinary              Goal: Patient/Family verbalizes awareness of resources       Dates: Start:  05/25/24       Disciplines: Interdisciplinary              Goal: Discusses signs/symptoms of illness/treatment options       Dates: Start:  05/25/24       Description: INTERVENTIONS:  1. Provide therapeutic environment  2. Complete daily flowsheet  3. Provide required programming    Disciplines: Interdisciplinary              Goal: Patient/Family participate in treatment and discharge plans       Dates: Start:  05/25/24       Description: INTERVENTIONS:  1. Complete daily flowsheet  2. Provide therapeutic environment    Disciplines: Interdisciplinary              Goal: Understands least restrictive measures       Dates: Start:  05/25/24       Description: INTERVENTIONS:  1. Utilize least restrictive measures    Disciplines: Interdisciplinary              Goal: Free from restraint events       Dates: Start:  05/25/24       Description: INTERVENTIONS:  1. Utilize least restrictive  measures  2. Provide behavioral interventions  3. Redirect inappropriate behaviors    Disciplines: Interdisciplinary                      Problem: Discharge Planning - Care Management       Dates: Start:  05/25/24       Disciplines: Nurse, Interdisciplinary, RT, Social Work      Goal: Discharge to post-acute care or home with appropriate resources       Dates: Start:  05/25/24       Description: INTERVENTIONS:  1. Conduct assessment to determine patient/family and health care team treatment goals, and need for post-acute services based on payer coverage, community resources, and patient preferences, and barriers to discharge  2. Address psychosocial, clinical, and financial barriers to discharge as identified in assessment in conjunction with the patient/family and health care team  3. Arrange appropriate level of post-acute services according to patient’s   needs and preference and payer coverage in collaboration with the physician and health care team  4. Communicate with and update the patient/family, physician, and health care team regarding progress on the discharge plan  5. Arrange appropriate transportation to post-acute venues    Disciplines: Nurse, Interdisciplinary, RT, Social Work      Outcomes       Date/Time User Outcome    05/25/24 1300 Yesika Bobby LCSW Progressing                              Problem: Educational/Scholastic Disruption       Dates: Start:  05/25/24       Disciplines: Interdisciplinary      Goal: Meets educational requirements during hospitalization       Dates: Start:  05/25/24       Disciplines: Interdisciplinary              Goal: Attends class without disruptive behavior       Dates: Start:  05/25/24       Disciplines: Interdisciplinary              Goal: Completes daily assignments       Dates: Start:  05/25/24       Disciplines: Interdisciplinary                      Problem: Fall/Injury       Dates: Start:  05/25/24       Disciplines: Interdisciplinary      Goal: Not  fall by end of shift       Dates: Start:  05/25/24       Disciplines: Interdisciplinary              Goal: Be free from injury by end of the shift       Dates: Start:  05/25/24       Disciplines: Interdisciplinary      Outcomes       Date/Time User Outcome    05/25/24 1039 Cesia Greenwood RN Progressing                      Goal: Verbalize understanding of personal risk factors for fall in the hospital       Dates: Start:  05/25/24       Disciplines: Interdisciplinary              Goal: Verbalize understanding of risk factor reduction measures to prevent injury from fall in the home       Dates: Start:  05/25/24       Disciplines: Interdisciplinary              Goal: Use assistive devices by end of the shift       Dates: Start:  05/25/24       Disciplines: Interdisciplinary              Goal: Pace activities to prevent fatigue by end of the shift       Dates: Start:  05/25/24       Disciplines: Interdisciplinary                      Problem: Financial Problem       Dates: Start:  05/25/24       Disciplines: Interdisciplinary, SLP      Goal: Assess and Address Specific Financial Obstacles Affecting Patient's Adderence to Plan of Care       Dates: Start:  05/25/24       Disciplines: Interdisciplinary      Outcomes       Date/Time User Outcome    05/25/24 1300 Yesika Bobby LCSW Progressing              Intervention: Interview for adherence concerns and validate for financial causation       Dates: Start:  05/25/24                 Intervention: Develop plan to address financial concerns       Dates: Start:  05/25/24                         Goal: STG - Patient will complete simple calculations for time/money management       Dates: Start:  05/25/24       Disciplines: Interdisciplinary, SLP      Outcomes       Date/Time User Outcome    05/25/24 Bennett Bobby LCSW Progressing                              Problem: Ineffective Coping       Dates: Start:  05/25/24       Disciplines:  Interdisciplinary      Goal: Cooperates with admission process       Dates: Start:  05/25/24       Description: INTERVENTIONS:  1. Complete admission process    Disciplines: Interdisciplinary              Goal: Identifies ineffective coping skills       Dates: Start:  05/25/24       Disciplines: Interdisciplinary              Goal: Identifies healthy coping skills       Dates: Start:  05/25/24       Disciplines: Interdisciplinary              Goal: Demonstrates healthy coping skills       Dates: Start:  05/25/24       Disciplines: Interdisciplinary              Goal: Participates in unit activities       Dates: Start:  05/25/24       Description: INTERVENTIONS:  1. Provide therapeutic environment  2. Provide required programming  3. Redirect inappropriate behaviors    Disciplines: Interdisciplinary              Goal: Patient/Family participate in treatment and discharge plans       Dates: Start:  05/25/24       Description: INTERVENTIONS:  1. Complete daily flowsheet  2. Provide therapeutic environment    Disciplines: Interdisciplinary              Goal: Patient/Family verbalizes awareness of resources       Dates: Start:  05/25/24       Disciplines: Interdisciplinary              Goal: Understands least restrictive measures       Dates: Start:  05/25/24       Description: INTERVENTIONS:  1. Utilize least restrictive measures    Disciplines: Interdisciplinary              Goal: Free from restraint events       Dates: Start:  05/25/24       Description: INTERVENTIONS:  1. Utilize least restrictive measures  2. Provide behavioral interventions  3. Redirect inappropriate behaviors    Disciplines: Interdisciplinary                      Problem: Medication Adherence       Dates: Start:  05/25/24       Disciplines: Interdisciplinary      Goal: Adherence to Medication Regimen       Dates: Start:  05/25/24       Disciplines: Interdisciplinary      Outcomes       Date/Time User Outcome    05/25/24 Bennett Jones  KEVIN Bobby Progressing              Intervention: Monitor and educate on adherence of medications       Dates: Start:  05/25/24                                 Problem: Negative Experience, Conflict with, or Distrust of Providers and/or Health System       Dates: Start:  05/25/24       Disciplines: Interdisciplinary      Goal: Plan to Address Patient Specific Negative Experience, Distrust, or Conflict with Providers and/or Health System       Dates: Start:  05/25/24       Disciplines: Interdisciplinary      Outcomes       Date/Time User Outcome    05/25/24 1300 Yesika Jones KEVIN Bobby Progressing              Intervention: Explore concerns to validate presence of negative experience, distrust, and/or conflict with providers and/or the health system       Dates: Start:  05/25/24                 Intervention: Intervene as appropriate to address patient concerns, including education, and/or assistance in finding alternate providers       Dates: Start:  05/25/24                                 Problem: Potential for Harm to Self or Others       Dates: Start:  05/25/24       Disciplines: Interdisciplinary      Goal: Cooperates with admission process       Dates: Start:  05/25/24       Description: INTERVENTIONS:  1. Complete admission process    Disciplines: Interdisciplinary              Goal: Participates in unit activities       Dates: Start:  05/25/24       Description: INTERVENTIONS:  1. Provide therapeutic environment  2. Provide required programming  3. Redirect inappropriate behaviors    Disciplines: Interdisciplinary      Outcomes       Date/Time User Outcome    05/25/24 1039 Cesia Greenwood RN Progressing                      Goal: Patient/Family participate in treatment and discharge plans       Dates: Start:  05/25/24       Description: INTERVENTIONS:  1. Complete daily flowsheet  2. Provide therapeutic environment    Disciplines: Interdisciplinary              Goal: Identifies deescalation  techniques       Dates: Start:  05/25/24       Disciplines: Interdisciplinary              Goal: Understands least restrictive measures       Dates: Start:  05/25/24       Description: INTERVENTIONS:  1. Utilize least restrictive measures    Disciplines: Interdisciplinary              Goal: Identifies stressors that lead to harmful behaviors       Dates: Start:  05/25/24       Disciplines: Interdisciplinary              Goal: Notifies staff when experiencing harmful thoughts toward self/others       Dates: Start:  05/25/24       Disciplines: Interdisciplinary              Goal: Denies harm toward self or others       Dates: Start:  05/25/24       Disciplines: Interdisciplinary              Goal: Free from restraint events       Dates: Start:  05/25/24       Description: INTERVENTIONS:  1. Utilize least restrictive measures  2. Provide behavioral interventions  3. Redirect inappropriate behaviors    Disciplines: Interdisciplinary                      Problem: Potential for Substance Withdrawal       Dates: Start:  05/25/24       Disciplines: Interdisciplinary      Goal: Verbalizes signs/symptoms of withdrawal       Dates: Start:  05/25/24       Disciplines: Interdisciplinary              Goal: Reports signs/symptoms of withdrawal       Dates: Start:  05/25/24       Disciplines: Interdisciplinary              Goal: Free of withdrawal symptoms       Dates: Start:  05/25/24       Disciplines: Interdisciplinary                      Problem: Risk for Suicide       Dates: Start:  05/25/24       Description: Suicide Risk    Disciplines: Interdisciplinary      Goal: Accepts medications as prescribed/needed this shift       Dates: Start:  05/25/24       Description: Accepts medications as prescribed/needed this shift    Disciplines: Interdisciplinary      Outcomes       Date/Time User Outcome    05/25/24 1039 Cesia Greenwood RN Progressing                      Goal: Identifies supports this shift       Dates: Start:   05/25/24       Description: identifies supports this shift    Disciplines: Interdisciplinary              Goal: Makes needs known through verbalization or behaviors this shift       Dates: Start:  05/25/24       Description: makes needs known through verbalization or behaviors this shift    Disciplines: Interdisciplinary              Goal: No self harm this shift       Dates: Start:  05/25/24       Description: no self harm this shift    Disciplines: Interdisciplinary              Goal: Read Safety Guidelines this shift       Dates: Start:  05/25/24       Description: read Safety Guidelines this shift    Disciplines: Interdisciplinary              Goal: Complete Mental Health Safety Plan (psychiatry only) this shift       Dates: Start:  05/25/24       Description: complete Mental Health Safety Plan (psychiatry only) this shift    Disciplines: Interdisciplinary                      Problem: Risk of Exacerbation, or Readmission to Hospital Related to Symptoms of Comorbidities       Dates: Start:  05/25/24       Disciplines: Interdisciplinary      Goal: Knowledge and Symptom Management of Chronic Disease will be Documented       Dates: Start:  05/25/24       Disciplines: Interdisciplinary      Outcomes       Date/Time User Outcome    05/25/24 Bennett Bobby LCSW Progressing              Intervention: Identify comorbidities and instruct on symptom management  for self-management of care       Dates: Start:  05/25/24                                 Problem: Risk of Uncoordinated Care       Dates: Start:  05/25/24       Disciplines: Interdisciplinary      Goal: Care will be Coordinated and Supported by a Multidisciplinary Team of Providers       Dates: Start:  05/25/24       Disciplines: Interdisciplinary      Outcomes       Date/Time User Outcome    05/25/24 Bennett Bobby LCSW Progressing              Intervention: Identify all care team members, maintain contact information and the  supporting role are recorded in patients chart and available for all members of the team       Dates: Start:  05/25/24                                 Problem: Self Care Deficit       Dates: Start:  05/25/24       Disciplines: Interdisciplinary      Goal: STG - Patient completes hygiene       Dates: Start:  05/25/24       Description: INTERVENTIONS:  1. Encourage/perform oral hygiene as appropriate    Disciplines: Interdisciplinary              Goal: Increase group attendance       Dates: Start:  05/25/24       Description: INTERVENTIONS:  1. Provide group schedule and encourage to attend  2. Monitor and document participation    Disciplines: Interdisciplinary              Goal: Accepts need for medications       Dates: Start:  05/25/24       Description: INTERVENTIONS:  1. Educate on medication scheduled times    Disciplines: Interdisciplinary              Goal: STG - Goes to and eats meals independently in dining room 100% of time       Dates: Start:  05/25/24       Description: INTERVENTIONS:  1. [place] patient to unit/surroundings    Disciplines: Interdisciplinary                      Problem: Sensory Perceptual Alteration as Evidenced by       Dates: Start:  05/25/24       Description: - auditory/visual hallucinations  - posturing  - restlessness  - pacing  - increasing agitation  - aggression    Disciplines: Interdisciplinary      Goal: Cooperates with admission process       Dates: Start:  05/25/24       Description: INTERVENTIONS:  1. Complete admission process    Disciplines: Interdisciplinary              Goal: Patient/Family participate in treatment and discharge plans       Dates: Start:  05/25/24       Description: INTERVENTIONS:  1. Complete daily flowsheet  2. Provide therapeutic environment    Disciplines: Interdisciplinary              Goal: Patient/Family verbalizes awareness of resources       Dates: Start:  05/25/24       Disciplines: Interdisciplinary              Goal: Participates in unit  activities       Dates: Start:  05/25/24       Description: INTERVENTIONS:  1. Provide therapeutic environment  2. Provide required programming  3. Redirect inappropriate behaviors    Disciplines: Interdisciplinary              Goal: Discusses signs/symptoms of illness/treatment options       Dates: Start:  05/25/24       Description: INTERVENTIONS:  1. Provide therapeutic environment  2. Complete daily flowsheet  3. Provide required programming    Disciplines: Interdisciplinary              Goal: Initiates reality-based interactions       Dates: Start:  05/25/24       Description: INTERVENTIONS:  1. Redirect inappropriate behaviors  2. Provide reassurance and reality orientation    Disciplines: Interdisciplinary              Goal: Able to discuss content of hallucinations/delusions       Dates: Start:  05/25/24       Disciplines: Interdisciplinary              Goal: Notifies staff when experiencing hallucinations/delusions       Dates: Start:  05/25/24       Disciplines: Interdisciplinary              Goal: Verbalizes reduction in hallucinations/delusions       Dates: Start:  05/25/24       Disciplines: Interdisciplinary              Goal: Will not act on psychotic perception       Dates: Start:  05/25/24       Description: INTERVENTIONS:  1. Provide therapeutic environment  2. Redirect inappropriate behaviors  3. Provide behavioral interventions  4. Complete safety checks per protocol  5. Utilize least restrictive measures    Disciplines: Interdisciplinary              Goal: Understands least restrictive measures       Dates: Start:  05/25/24       Description: INTERVENTIONS:  1. Utilize least restrictive measures    Disciplines: Interdisciplinary              Goal: Free from restraint events       Dates: Start:  05/25/24       Description: INTERVENTIONS:  1. Utilize least restrictive measures  2. Provide behavioral interventions  3. Redirect inappropriate behaviors    Disciplines: Interdisciplinary                           Current Participants as of 6/3/2024      Name Type Comments Contact Info    Yesika Bobby LCSW       Electronically signed by Yesika Bobby LCSW at 5/25/2024 1304 EDT    Brooklyn Mora MD PhD Attending Provider  [phone]        Ruthy Mcrae Patient  [phone]

## 2024-05-25 NOTE — CARE PLAN
Problem: Risk for Suicide  Goal: Accepts medications as prescribed/needed this shift  Outcome: Progressing     Problem: Fall/Injury  Goal: Be free from injury by end of the shift  Outcome: Progressing     Problem: Potential for Harm to Self or Others  Goal: Participates in unit activities  Outcome: Progressing   The patient's goals for the shift include      The clinical goals for the shift include      Over the shift, the patient did not make progress toward the following goals.

## 2024-05-25 NOTE — H&P
History Of Present Illness  Ruthy Mcrae is a 28 y.o. female presenting with worsening of depression and SI with a method of overdosing on her medication    Physician Certification & Recertification:  Certification/Re-Certification: INITIAL   I certify that the inpatient psychiatric hospital admission is medically necessary for:  treatment which could reasonably be expected to improve the patient's condition that could not be provided in a less restrictive setting   I estimate the period of hospitalization are necessary for treatment of this patient will be:  8-14 days    My plans for post hospital care for this patient are:  home        HISTORY OF PRESENT ILLNESS  Patient is a 27yo  female with psychiatric hx of depression, anxiety, OCD who presented to emergency department 5/24/24 for worsening of depression and SI with a method of overdosing on her medication. She is from [facility] and grew up here so came back home to see her parents 1 week ago due to severe depression. Pt is medically cleared and admitted to [facility] [place] for further psychiatric evaluation and stabilization.     On evaluation at [facility] [place] 5/25/24, Pt endorses “struggling with increased depression” and “suicidal ideation” with plan to overdose but without intention and she wants to get help and “I came in because current medications not working and medications need adjusted. Pt reports it started about 6 months ago when she realized that her partner cheated on her. She states she is having “post infidelity stress symptoms” including “flashbacks of it all, ruminating thoughts, breakdown in my self-esteem and hopelessness.” She reports becoming increasingly depressed over the past two months with intensified symptoms over the past week. Pt endorse depressed mood, poor appetite, decreased energy, decreased concentration, trouble with sleep, increased feelings of hopelessness and helplessness and SI with plan to overdose. She also reports  experiencing anxiety, excessive worries about her life, rumination. She reports obsessive thoughts about her appearance/how people are treating her, paranoid thoughts about “what's happening and being said behind my back. Pt reports constantly crying while awake, barely eating and is not caring for herself. Patient has not been able to work and she came to [place] to be with her parents to seek help. Patient sent texts to her family prior to coming to ED 5/24/24 disclosing her thoughts of “hopelessness and not wanting to be alive,” which prompted her to come to the ED.  Pt denies any suicide attempt in the past, pt denies any family hx of suicide attempt. Pt denies manic symptoms. Pt denies use of alcohol or illicit drugs. Pt denies any guns at home.      Per EPAT 5/24/24  Patient is a 27yo  female presenting to the ED due to SI. Patient's provider note and community record was reviewed. Patient's chart hx is limited, as she is a resident of [place], [place]. She reported that she is currently visiting her parents for support as a result of “a breakdown with my mental health.” She endorsed SI with a method of overdosing on her medication, but denied having intent. She was recently hospitalized in April at a psychiatric facility in [place] due to SI and ingesting too much of her medication “to sleep,” denying that this was an attempt of suicide. Patient is currently working with a psychiatrist in [place] and identified that she has been diagnosed with OCD and depression r/o BPD. Patient denied HI/AH/VH.    PPH  See H&P above      Current Facility-Administered Medications   Medication Dose Route Frequency Provider Last Rate Last Admin    acetaminophen (Tylenol) tablet 650 mg  650 mg oral q4h PRN Brooklyn Mora MD PhD        docusate sodium (Colace) capsule 100 mg  100 mg oral BID PRN Brooklyn Mora MD PhD        fLuvoxaMINE (Luvox) tablet 25 mg  25 mg oral BID Brooklyn Mora MD PhD   25 mg at 05/25/24 0416     LORazepam (Ativan) tablet 1 mg  1 mg oral q6h PRN Brooklyn Mora MD PhD        OLANZapine (ZyPREXA) tablet 5 mg  5 mg oral Nightly Brooklyn Mora MD PhD         No Known Allergies  OARRS    Risk History  Suicide: Suicidal Thoughts/Method/Intent/Plan: active suicidal thoughts, method, and plan  Suicide Attempts/Preparations: None, denied  Number of Suicide Attempts: 0  Access to Firearms/Lethal Means: No guns in home  Non-Suicidal Self Injury: None, denied  Last Cooper Risk Score:    Protective Factors: hopefulness/future orientation, social support/connectedness, and positive family relationships    Violence: None, denied  Homicidal Thoughts/Method/Plan/Intent: None, denied  Homicidal Attempts/Preparations: None, denied  Number of Attempts: 0    Substance Use History  Social History     Substance and Sexual Activity   Alcohol Use None     Social History     Substance and Sexual Activity   Drug Use Not on file         Family History  No family history on file.    Social History  [place] Service/Education History  Current or Previous  Service: None  Education Level: College  History of Learning Problems: No  History of School Behavior Problems: No     Social/Cultural History  Cultural Requests During Hospitalization: None reported  Spiritual Requests During Hospitalization: None reported     Legal  Legal Considerations: Patient/ Family Ability to Make Healthcare Decisions  Assistance with Managing/Advocating Healthcare Needs:  (Self)  Criminal Activity/ Legal Involvement Pertinent to Current Situation/ Hospitalization: None reported  Legal Concerns: n/a     Drug Screening  Have you used any substances (canabis, cocaine, heroin, hallucinogens, inhalants, etc.) in the past 12 months?: No  Have you used any prescription drugs other than prescribed in the past 12 months?: No  Is a toxicology screen needed?: Yes    Trauma History  Pt reports it started about 6 months ago when she realized that her partner cheated on  "her.     Past Medical History    No past medical history on file.    REVIEW OF SYSTEMS  ROS negative      OBJECTIVE INFORMATION  Objective        5/24/2024     4:20 PM 5/25/2024     6:30 AM 5/25/2024     8:16 AM 5/25/2024     8:17 AM   Vitals   Systolic 105 101 98 90   Diastolic 71 67 73 59   Heart Rate 83 62 74 90   Temp 36.8 °C (98.2 °F) 36.3 °C (97.3 °F)  35.8 °C (96.4 °F)   Resp 16 17 16    Height (in) 1.778 m (5' 10\")  1.778 m (5' 10\")    Weight (lb) 160  161.82    BMI 22.96 kg/m2  23.22 kg/m2    BSA (m2) 1.89 m2  1.9 m2      Daily Weight  05/25/24 : 73.4 kg (161 lb 13.1 oz)                                                                                                                 Mental Status Exam:   General: 29 yo CF hx recurrent MDD, OCD, anxiety, admitted for worsening of depression and SI, s/p recent psychiatric inpt in April 2024, s/p excessive use of medication for sleep    Appearance: Appears stated age.    Attitude: cooperative, calm    Behavior: Appropriate eye contact.   Motor Activity: No TD. Normal gait/station. normal muscle tone/bulk.   Speech: Normal rate, lower volume, monotone, spontaneous, fluent.   Mood: \"sad\", struggling with increased depression” and “suicidal ideation”    Affect: depressed   Thought Process: Organized, linear, goal directed.   Thought Content: struggling with increased depression” and “suicidal ideation” with plan to overdose but without intention and she wants to get help.    Thought Perception: denies hallucinations. does not appear to be responding to hallucinatory stimuli   Cognition: Alert, oriented x3. Adequate fund of knowledge. Grossly intact.    Insight: fair; as patient recognizes symptoms of illness and need for recommended treatments.   Judgment: fair; actively seeking help.        FUNCTIONAL ESTIMATES  Estimate of Intelligence: average, a   Estimate of Capacity for Activities of Daily Living:   independent,      CRANIAL NERVE EXAM  ·  Cranial Nerves: II, " III, IV, VI: vision grossly within functional limits. PERRLA. Extraocular movements are grossly intact  ·  Cranial Nerves: V: facial sensation intact bilaterally  ·  Cranial Nerves: VII: smile symmetric  ·  Cranial Nerves: VIII: hearing grossly intact bilaterally  ·  Cranial Nerves: IX, X: phonation normal. palate and uvula elevate symmetrically  ·  Cranial Nerves: XI: shoulder shrug strong, equal bilaterally  ·  Cranial Nerves: XII: tongue midline, symmetrical  ·  Reflexes: Reflexes grossly intact. No clonus  ·  Sensation: sensation is grossly intact to pain and light touch.  ·  Motor: Muscle tone within functional limits. Strength is 5/5 x4  ·  Cerebellar: finger to nose touch within normal limits. Gait is steady with a normal base. Coordination grossly intact    CURRENT MEDICATIONS  Scheduled medications  fLuvoxaMINE, 25 mg, oral, BID  OLANZapine, 5 mg, oral, Nightly       PRN medications  PRN medications: acetaminophen, docusate sodium, LORazepam    LABS   No results found.  [id number]    RADIOLOGY Results:   [unfilled]         Patient is a 27yo  female with psychiatric hx of depression, anxiety, OCD who presented to emergency department 5/24/24 for worsening of depression and SI with a method of overdosing on her medication. She is from [facility] and grew up here so came back home to see her parents 1 week ago due to severe depression. Pt is medically cleared and admitted to [facility] for further psychiatric evaluation and stabilization.     Dx  MDD recurrent, severe with mild paranoia   OCD  Psychosocial stressors (partner cheated on her, per pt, 6 months ago, triggered current presenation)      PLAN  - Admit to [facility] Inpatient Psychiatry Unit  - Restrict to [place]  - Legal Status: VOLUNTARY  - Suicide/Behavioral/Elopement Precautions  - Collateral from outside resource  - General PRNs: Acetaminophen prn pain, MoM prn constipation, Maalox prn dyspepsia  - DVT  prophylaxis: Ambulatory  - Diet: Regular  - Group/Milieu & Music therapy - Coping Strategies, Social Skills  - MUSIC THERAPY CONSULT - Coping  - OT CONSULT - Safety Assessment  - INTERNAL MEDICINE CONSULT - medical management  - SW CONSULT - COLLATERAL    Labs studies:  BMP, LFT, CBC, TSH, UA, UTox done.  I have reviewed these labs in the emr and or chart.  Continue to monitor patient's response to treatment, including medications.  Monitor for emerging side effects and focus on safety issues and improved thought organization / emotional control.  Encourage compliance with current medication treatment.    Psychotropic Medications recommendations:  Will not resume Lexapro 40mg daily, not effective (it was increased a month ago from 20mg which she has been on for 10 years)   Will not resume risperidone (started a month ago), not effective  Will not resume Remeron 30mg qhs, not effective    New trial:  Fluvoxamine to replace Lexapro and Remeron for depression, OCD;    *starting 25mg BID  Olanzapine to replace risperidone for depression, OCD, rumination   *starting 2.5mg QHS    Therapy:   Group/Milieu & Music therapy - Coping Strategies, Social Skills; group and 1:1 options; programming, relaxation techniques, coping skills, music, art.    Consults:  OT consult: safety assessment; cognitive assessment  Internal medicine- medical management  Social work consult: Coping, disposition planning; Follow up outpatient appointments    Medical issues- Medical team to follow, Appreciate Consults    DISPOSITION:   -Collateral from outside resource.  -SW consulted to assist, collateral, psychosocial issues, and disposition  -ELOS 3-7 days  -outpt referral for psychotherapy  -outpt referral for psychiatric followup    DISPOSITION: ELOS 5days    Goal of inpatient psychiatry includes:  -symptom relief and coordination of care to promote recovery by daily assessment of risk  - collaboration of family/friends, continuing support plans  are developed in preparation for discharge  -prevention of harm/destruction of self, others, and/or property  -prevention of of exacerbation of psychiatric symptoms  -management of medication with close monitoring of effects and control of side effects  -clinical interventions to address lack of impulse control OR SI,  HI, psychotic state, decreased functioning, failure to take medication resulting in symptom increase  - group therapy and psychoeducational groups daily  - SW consult dc planning    - The patient's admitting diagnosis, average indicated length of stay, risks and benefits of medication management the duration of need for medication treatment and post discharge plan of referral for follow up with mental health care, which will include referral to outpatient psychiatry/therapy, was reviewed with the patient, at the time of this admission.   - Safety counseling with emphasis on when and how to access 24 hour emergency services in mental as well as medical health (Mobile Crisis, 911 or coming to the nearest ER) was reviewed with the patient at the time of admission and will be reiterated during inpatient stay and at the time of discharge. Referral will be made to return to medication management, therapist, case management as is indicated.  - Discharge to community once psychiatrically stable with outpatient follow up          Medication Consent  Medication Consent: risks, benefits, side effects reviewed for all ordered medications and patient expressed understanding; patient consent obtained      I spent 60minutes in the professional and overall care of this patient.      Brooklyn Mora MD PhD

## 2024-05-25 NOTE — PROGRESS NOTES
REHAB Therapy Assessment & Treatment    Patient Name: Ruthy Mcrae  MRN: [id number]  Today's Date: 5/25/2024      Activity Assessment:  Initial Assessment  Attention Span: 15-30 Miutes  Cognitive Behavior Status/Orientation: Place, Person, Time, Situation, Capable, Attentive  Crisis Triggers: Emotions, Family/friends, Isolation, Mood (breakup with partner following infedelity, rumination, obsessive thoughts about appearance, worried how others perceive patient, decrease in appetite, increase in depression)  Emotional Concerns/Mood/Affect: Alert, Sad/tearful  Hearing: Adequate  Memory: Memory intact  Motivation Level: Minimum encouragement needed  Negative Coping Skills:  (not eating, negative self talk)  Speech/Communication/Socialization: Verbal  Vision: Adequate    Leisure Survey:   Rehab Leisure Interest Survey  Activity Preference: Group, Independent  Activity Tolerance: Fair 15-30 minutes  At Home ADL Deficits:  (difficulty keeping up with daily tasks following breakup 6 months ago)  Barriers to Leisure Participation: Emotions, Mood/affect, Lack of motivation, Low self-esteem  Community Resources:  (Aware of community resources)  Creative Activities: Adult coloring [employer], [place]  Education/School: graduated from [employer]  Following Directions: Able to follow multi-step commands  Leisure Interests:  (Not active with leisure interests following breakup)  Living Arrangement: Alone  Motivators for Recreation/Leisure Involvement: Relaxation, Fun/entertainment, Sense of well being/contentment, Self-esteem/sense of accomplishment  Outdoor Activities: Trips/traveling  Patient/Family Education Needs: safety awareness  Patient Strengths: familial support  Patient Weakness: isolation, ruminating thoughts  Physial Activity:  (walking)  Social/Group Activities:  (cats)  Solitary Activities: Reading, Music (writing)  Transportation: Drives  Work/Volunteer:  (employed- currently on leave)      Encounter Problems        Encounter Problems (Active)       Distress Tolerance BH RT       To learn ways to manage stress       Start:  05/25/24    Expected End:  06/01/24               Leisure (appropriate exploration/participation)       Explore/identify 1-2 potentially meaniningful leisure activities for post discharge       Start:  05/25/24    Expected End:  06/01/24               Social       Stimulation       Start:  05/25/24    Expected End:  06/01/24                     Education Documentation  No documentation found.  Education Comments  No comments found.          Additional Comments:

## 2024-05-25 NOTE — NURSING NOTE
"The pt was calm and cooperative during the admission and interview that took place in the hallway. The pt rated her anxiety a 7/10, depression 8/10, denies SI, HI and AVH at this time as well as pain rating it a 0/10. Pt goal for admission, \"To get into a more stable place.\" Goal for the shift, \"make it through.\" Pt strengths, \"I'm smart and I'm good with people.\" This nurse called [facility] [employer] and verified the pt's medications as well as had them fax a copy that is in the pt's chart under medications. The  pharmacy sent this nurse a message stating they do not carry the pt's birth control. This nurse spoke to the pt who verbalized, \"I'll have my mom bring it in tomorrow.\" This nurse informed  pharmacy. The pt was medication compliant with her afternoon Luvox. The pt was observed eating dinner and interacting with her peers. Q 15 minute monitoring continued.   "

## 2024-05-25 NOTE — SIGNIFICANT EVENT
Application for Emergency Admission      Ready for Transfer?  Is the patient medically cleared for transfer to inpatient psychiatry: Yes  Has the patient been accepted to an inpatient psychiatric hospital: Yes    Application for Emergency Admission  IN ACCORDANCE [place]10 [place]  The Chief Clinical Officer of: [facility] 5/25/2024 .6:21 AM    Reason for Hospitalization  The undersigned has reason to believe that: Ruthy Mcrae Is a mentally ill person subject to hospitalization by court order under division B Section 5122.01 of the Revised Code, i.e., this person:    1.Yes  Represents a substantial risk of physical harm to self as manifested by evidence of threats of, or attempts at, suicide or serious self-inflicted bodily harm    2.No Represents a substantial risk of physical harm to others as manifested by evidence of recent homicidal or other violent behavior, evidence of recent threats that place another in reasonable fear of violent behavior and serious physical harm, or other evidence of present dangerousness    3.No Represents a substantial and immediate risk of serious physical impairment or injury to self as manifested by  evidence that the person is unable to provide for and is not providing for the person's basic physical needs because of the person's mental illness and that appropriate provision for those needs cannot be made  immediately available in the community    4.No Would benefit from treatment in a hospital for his mental illness and is in need of such treatment as manifested by evidence of behavior that creates a grave and imminent risk to substantial rights of others or  himself.    5.No Would benefit from treatment as manifested by evidence of behavior that indicates all of the following:       (a) The person is unlikely to survive safely in the community without supervision, based on a clinical determination.       (b) The person has a history of lack of compliance with  treatment for mental illness and one of the following applies:      (i) At least twice within the thirty-six months prior to the filing of an affidavit seeking court-ordered treatment of the person under section 5122.111 of the Revised Code, the lack of compliance has been a significant factor in necessitating hospitalization in a hospital or receipt of services in a forensic or other mental health unit of a correctional facility, provided that the thirty-six-month period shall be extended by the length of any hospitalization or incarceration of the person that occurred within the thirty-six-month period.      (ii) Within the forty-eight months prior to the filing of an affidavit seeking court-ordered treatment of the person under section 5122.111 of the Revised Code, the lack of compliance resulted in one or more acts of serious violent behavior toward self or others or threats of, or attempts at, serious physical harm to self or others, provided that the forty-eight-month period shall be extended by the length of any hospitalization or incarceration of the person that occurred within the forty-eight-month period.      (c) The person, as a result of mental illness, is unlikely to voluntarily participate in necessary treatment.       (d) In view of the person's treatment history and current behavior, the person is in need of treatment in order to prevent a relapse or deterioration that would be likely to result in substantial risk of serious harm to the person or others.    (e) Represents a substantial risk of physical harm to self or others if allowed to remain at liberty pending examination.    Therefore, it is requested that said person be admitted to the above named facility.    STATEMENT OF BELIEF    Must be filled out by one of the following: a psychiatrist, licensed physician, licensed clinical psychologist, health or ,  or .  (Statement shall include the circumstances under  which the individual was taken into custody and the reason for the person's belief that hospitalization is necessary. The statement shall also include a reference to efforts made to secure the individual's property at his residence if he was taken into custody there. Every reasonable and appropriate effort should be made to take this person into custody in the least conspicuous manner possible.)    Patient has been depressed and having suicidal ideation.  She has had previous suicidal thoughts and been hospitalized in the past with a overdose attempt.  She will require hospitalization and stabilization.    Quinton Dominguez MD 5/25/2024     _____________________________________________________________   Place of Employment: [facility]    STATEMENT OF OBSERVATION BY PSYCHIATRIST, LICENSED PHYSICIAN, OR LICENSED CLINICAL PSYCHOLOGIST, IF APPLICABLE    Place of Observation (e.g., [facility] mental health center, general hospital, office, emergency facility)  (If applicable, please complete)    Quinton Dominguez MD 5/25/2024    _____________________________________________________________

## 2024-05-25 NOTE — GROUP NOTE
"Group Topic: Self-Care/Wellness   Group Date: 5/25/2024  Start Time: 0930  End Time: 1025  Facilitators: MINA Trujillo   Department: [facility] REHAB THERAPY VIRTUAL    Number of Participants: 10   Group Focus: anxiety, chemical dependency issues, coping skills, feeling awareness/expression, leisure skills, problem solving, relapse prevention, safety plan, and self-awareness  Treatment Modality: Recreation Therapy  Interventions utilized were assignment, leisure development, patient education, problem solving, and support  Purpose: coping skills, insight or knowledge, self-worth, self-care, and relapse prevention strategies    Name: Ruthy Mcrae YOB: 1995   MR: [id number]      Facilitator: Recreational Therapist  Level of Participation: did not attend  Progress: None  Comments: The recreational therapy group focused on \"Mental Health Maintenance Plan\". Patients delve into personalized sections addressing triggers, warning signs, self-care practices, coping strategies, and navigating the path back to well-being. Through open dialogue and collaborative exploration, individuals gain invaluable insights, fostering resilience and empowering mental health management.     Patient declined invitation to group activity at this time. Patient will continue to be provided with opportunities to enhance leisure skills and/or coping mechanisms.    Plan: continue with services      "

## 2024-05-25 NOTE — PROGRESS NOTES
Oncoming physician note from Dr. Quinton Dominguez    I assumed care of the patient on 05/25/24 at 6:23 AM     I reviewed the chart, labs and imaging. I talked to the off going physician. I personally saw the patient and made/approved the management plan and take responsibility for the patient management.     I reviewed the labs and received a phone call from [facility] where patient had been accepted to the behavioral health unit at [facility].  A pink slip was entered.  Patient has been sleeping in the emergency room.    ED Course as of 05/25/24 0623   Fri May 24, 2024   1907 28-year-old female presents emergency department for suicidal ideation.  She is from [facility] and grew up here so came back home to see her parents 1 week ago due to severe depression.  There was infidelity in her relationship 6 months ago and it brought up a lot of bad thoughts.  She has had body dysmorphia where she feels like she is so ugly and does not want to live anymore because of it.  Patient has not been able to work has not been able to eat and is not taking care of herself with severe depression I do think patient would benefit from inpatient placement.  Patient is medically cleared awaiting EPAT evaluation.   [HD]   2338 EPAT to place [HD]   Sat May 25, 2024   0105 Referred to 2 N. awaiting acceptance. [HD]      ED Course User Index  [HD] Deidra Mcintyre DO         Diagnoses as of 05/25/24 0623   Suicidal behavior without attempted self-injury

## 2024-05-25 NOTE — CONSULTS
Consults    Reason For Consult  Medical management    History Of Present Illness  Ruthy Mcrae is a 28 y.o. female who presented to the ED for suicidal ideation. Per documentation, she is from [facility] and grew up here so came back home to see her parents 1 week ago due to severe depression. There was infidelity in her relationship 6 months ago and it brought up a lot of bad thoughts. She has had body dysmorphia where she feels like she is so ugly and does not want to live anymore because of it. department for suicidal ideation. She is from [facility] and grew up here so came back home to see her parents 1 week ago due to severe depression. There was infidelity in her relationship 6 months ago and it brought up a lot of bad thoughts. She has had body dysmorphia where she feels like she is so ugly and does not want to live anymore because of it.      Past Medical History  She has no past medical history on file.    Surgical History  No surgical history     Social History  She reports that she has never smoked. She has never used smokeless tobacco. No history on file for alcohol use and drug use.    Family History  No CAD     Allergies  Patient has no known allergies.    Review of Systems  Review of Systems   Constitutional: Negative.    HENT:  Negative for ear discharge.    Respiratory: Negative.     Cardiovascular: Negative.    Gastrointestinal: Negative.    Endocrine: Negative.    Genitourinary: Negative.    Musculoskeletal: Negative.    Skin: Negative.    Neurological: Negative.    Psychiatric/Behavioral:  Positive for decreased concentration. Negative for agitation and behavioral problems.          Physical Exam  Physical Exam  Constitutional:       Appearance: She is normal weight.   HENT:      Head:      Comments: Right ear inflammation, non tender     Right Ear: External ear normal.      Left Ear: External ear normal.      Mouth/Throat:      Mouth: Mucous membranes are moist.   Cardiovascular:       Rate and Rhythm: Regular rhythm.   Pulmonary:      Breath sounds: Normal breath sounds.   Abdominal:      General: Abdomen is flat.   Skin:     Findings: Erythema present.      Comments: Right ear   Neurological:      Mental Status: She is alert and oriented to person, place, and time.   Psychiatric:         Mood and Affect: Mood is depressed.             Last Recorded Vitals  BP 90/59 (Patient Position: Standing)   Pulse 90   Temp 35.8 °C (96.4 °F)   Resp 16   Wt 73.4 kg (161 lb 13.1 oz)   SpO2 98%     Relevant Results  Results for orders placed or performed during the hospital encounter of 05/25/24 (from the past 24 hour(s))   Glucose, Fasting   Result Value Ref Range    Glucose, Fasting 84 74 - 99 mg/dL   Lipid Panel   Result Value Ref Range    Cholesterol 165 0 - 199 mg/dL    HDL-Cholesterol 39.0 mg/dL    Cholesterol/HDL Ratio 4.2     LDL Calculated 107 (H) <=99 mg/dL    VLDL 19 0 - 40 mg/dL    Triglycerides 96 0 - 149 mg/dL    Non HDL Cholesterol 126 0 - 149 mg/dL               Patient Active Problem List   Diagnosis    Major depressive disorder, recurrent (CMS-HCC)          Assessment/Plan     Hypotension  -Holding the clonidine until the BP permits  -Monitor BP    Ear piercing  -Inflammation noted  -Cleanse right ear piercing with saline and apply bacitracin  -Monitor for pain and drainage    MDD  -Managed by psychiatry     PAPITO Lester-CNP

## 2024-05-25 NOTE — PROGRESS NOTES
EPAT - Social Work Psychiatric Assessment    Arrival Details  Mode of Arrival: Ambulatory  Admission Source: Home  Admission Type: Involuntary  EPAT Assessment Start Date: 05/24/24  EPAT Assessment Start Time: 2142  Name of : Kenya Smith LPC    History of Present Illness  Admission Reason: SI  HPI: Patient is a 29yo  female presenting to the ED due to SI. Patient’s provider note and community record was reviewed. Patient’s chart hx is limited, as she is a resident of [place], [place]. She reported that she is currently visiting her parents for support as a result of “a breakdown with my mental health.” She endorsed SI with a method of overdosing on her medication, but denied having intent. She was recently hospitalized in April at a psychiatric facility in [place] due to SI and ingesting too much of her medication “to sleep,” denying that this was an attempt of suicide. Patient is currently working with a psychiatrist in [place] and identified that she has been diagnosed with OCD and depression r/o BPD. Patient denied HI/AH/VH.    SW Readmission Information   Readmission within 30 Days: No    Psychiatric Symptoms  Anxiety Symptoms: Generalized  Depression Symptoms: Appetite change, Change in energy level, Feelings of helplessness, Feelings of hopelessess, Feelings of worthlessness, Impaired concentration, Loss of interest  Licha Symptoms: No problems reported or observed.    Psychosis Symptoms  Hallucination Type: No problems reported or observed.  Delusion Type: No problems reported or observed.    Additional Symptoms - Adult  Generalized Anxiety Disorder: Excessive anxiety/worry  Obsessive Compulsive Disorder: Intrusive thoughts, Repetitive thoughts, Ruminatory thoughts  Panic Attack: No problems reported or observed.  Post Traumatic Stress Disorder: No problems reported or observed.  Delirium: No problems reported or observed.         Current Mental Health Contacts    Name/Phone Number: n/a   Last Appointment Date: n/a  Provider Name/Phone Number: Psychiatry in [place], [place]  Provider Last Appointment Date: Unknown    Support System: Immediate family, Friends    Living Arrangement: House    Home Safety  Feels Safe Living in Home: Yes    Income Information  Employment Status for: Patient  Employment Status: Employed  Income Source: Employed    [employer]/Education History  Current or Previous  Service: None  Education Level: College  History of Learning Problems: No  History of School Behavior Problems: No    Social/Cultural History  Cultural Requests During Hospitalization: None reported  Spiritual Requests During Hospitalization: None reported    Legal  Legal Considerations: Patient/ Family Ability to Make Healthcare Decisions  Assistance with Managing/Advocating Healthcare Needs:  (Self)  Criminal Activity/ Legal Involvement Pertinent to Current Situation/ Hospitalization: None reported  Legal Concerns: n/a    Drug Screening  Have you used any substances (canabis, cocaine, heroin, hallucinogens, inhalants, etc.) in the past 12 months?: No  Have you used any prescription drugs other than prescribed in the past 12 months?: No  Is a toxicology screen needed?: Yes    Stage of Change  Stage of Change: Precontemplation  History of Treatment:  (n/a)  Treatment Offered:  (n/a)  Duration of Substance Use: Denied use  Frequency of Substance Use: n/a  Age of First Substance Use: n/a    Psychosocial  Affect: Appropriate to circumstances  Parent/Guardian/Significant Other Involvement: No involvement    Orientation  Orientation Level: Oriented X4    General Appearance  Motor Activity: Unremarkable  Speech Pattern:  (Clear, fluid)  General Attitude: Cooperative  Appearance/Hygiene: Unremarkable    Thought Process  Content: Compulsion  Delusions:  (None elicited)  Perception: Not altered  Hallucination: None  Judgment/Insight: Impaired  Confusion: None  Cognition:  "Follows commands    Sleep Pattern  Sleep Pattern:  (Patient reported that she feels she has been oversleeping)    Risk Factors  Self Harm/Suicidal Ideation Plan: SI with method of overdosing on her medication  Previous Self Harm/Suicidal Plans: Patient reported: \"I was feeling like I wanted to die, very depressed, and I took too many of my medication but I was just trying to sleep.\"    Violence Risk Assessment  Assessment of Violence: None noted  Thoughts of Harm to Others: No    Ability to Assess Risk Screen  Risk Screen - Ability to Assess: Able to be screened  Ask Suicide-Screening Questions  1. In the past few weeks, have you wished you were dead?: Yes  2. In the past few weeks, have you felt that you or your family would be better off if you were dead?: Yes  3. In the past week, have you been having thoughts about killing yourself?: Yes  4. Have you ever tried to kill yourself?: No  5. Are you having thoughts of killing yourself right now?: No  Calculated Risk Score: Potential Risk  [place] Suicide Severity Rating Scale (Screener/Recent Self-Report)  1. Wish to be Dead (Past 1 Month): Yes  2. Non-Specific Active Suicidal Thoughts (Past 1 Month): Yes  3. Active Suicidal Ideation with any Methods (Not Plan) Without Intent to Act (Past 1 Month): Yes  4. Active Suicidal Ideation with Some Intent to Act, Without Specific Plan (Past 1 Month): No  5. Active Suicidal Ideation with Specific Plan and Intent (Past 1 Month): No  6. Suicidal Behavior (Lifetime): Yes  6. Suicidal Behavior (3 Months): Yes  6. Suicidal Behavior (Description): Patient reported that she took too many tablets of her medication \"because I wanted to sleep\"  Calculated C-SSRS Risk Score (Lifetime/Recent): High Risk  Step 1: Risk Factors  Current & Past Psychiatric Dx: Mood disorder  Presenting Symptoms: Anhedonia, Hopelessness or despair, Anxiety and/or panic  Precipitants/Stressors: Triggering events leading to humiliation, shame, and/or despair " (e.g. loss of relationship, financial or health status) (real or anticipated)  Access to Lethal Methods :  (Denied)  Step 2: Protective Factors   Protective Factors Internal: Identifies reasons for living  Protective Factors External: Supportive social network or family or friends  Step 3: Suicidal Ideation Intensity  Most Severe Suicidal Ideation Identified: SI with method of overdosing on medication  How Many Times Have You Had These Thoughts: 2-5 times in a week  When You Have the Thoughts How Long do They Last : 1-4 hours/a lot of the time  Could/Can You Stop Thinking About Killing Yourself or Wanting to Die if You Want to: Can control thoughts with a lot of difficulty  Are There Things - Anyone or Anything - That Stopped You From Wanting to Die or Acting on: Uncertain that deterrents stopped you  What Sort of Reasons Did You Have For Thinking About Wanting to Die or Killing Yourself: Mostly to end or stop the pain (you couldn't go on living with the pain or how you were feeling)  Total Score: 17  Step 5: Documentation  Risk Level: High suicide risk    Psychiatric Impression and Plan of Care    Assessment and Plan: Patient was A&Ox4 and remained cooperative. She explained that she began having “a breakdown with my mental health” 6 months ago after finding out that her partner cheated on her. She began having “post infidelity stress symptoms” including “flashbacks of it all, ruminating thoughts, breakdown in my self-esteem and hopelessness.” She reported becoming increasingly depressed over the past two months with intensified symptoms over the past week. She traveled back to [place] to stay with her parents for added support, sharing that she recently took time off from work to address her current needs. She expressed having obsessive thoughts about her appearance/how people are treating her, paranoid thoughts about “what’s happening and being said behind my back,” and an inability to focus. She expressed having an  inability to function for the past two days, noting that she has been oversleeping, constantly crying while awake, barely eating and is not caring for herself. She began having suicidal thoughts one week ago, reporting that she has a method of ingesting all of her medication, but denied current intent. She denied HI/AH/VH and did not appear to be internally stimulated. Patient sent texts to her family today disclosing her thoughts of “hopelessness and not wanting to be alive,” which prompted her to come to the ED. She continues to believe that she would benefit from further assessment of her medications.         Due to patient’s ongoing SI w/ a specific method/ means of completion and increase in depression, she remains an acute risk of harm to herself. She is being recommended for inpatient psychiatric hospitalization for safety and stabilization. The attending physician is in agreement.    Specific Resources Provided to Patient: Patient will be hospitalized  CM Notified: n/a  PHP/IOP Recommended: n/a  Specific Information Provided for PHP/IOP: n/a    Outcome/Disposition  Patient's Perception of Outcome Achieved: Patient is in agreement with admission  Assessment, Recommendations and Risk Level Reviewed with: Dr. Mcintyre  Contact Name: Dedrick Mcrae  Contact Number(s): [phone]  Contact Relationship: Father  EPAT Assessment Completed Date: 05/24/24  EPAT Assessment Completed Time: 1003

## 2024-05-26 PROCEDURE — 1240000001 HC SEMI-PRIVATE BH ROOM DAILY

## 2024-05-26 PROCEDURE — 2500000001 HC RX 250 WO HCPCS SELF ADMINISTERED DRUGS (ALT 637 FOR MEDICARE OP): Performed by: PSYCHIATRY & NEUROLOGY

## 2024-05-26 PROCEDURE — 2500000002 HC RX 250 W HCPCS SELF ADMINISTERED DRUGS (ALT 637 FOR MEDICARE OP, ALT 636 FOR OP/ED): Performed by: PSYCHIATRY & NEUROLOGY

## 2024-05-26 PROCEDURE — 2500000001 HC RX 250 WO HCPCS SELF ADMINISTERED DRUGS (ALT 637 FOR MEDICARE OP): Performed by: NURSE PRACTITIONER

## 2024-05-26 PROCEDURE — 99232 SBSQ HOSP IP/OBS MODERATE 35: CPT | Performed by: PSYCHIATRY & NEUROLOGY

## 2024-05-26 RX ADMIN — BACITRACIN 1 APPLICATION: 500 OINTMENT TOPICAL at 15:30

## 2024-05-26 RX ADMIN — BACITRACIN 1 APPLICATION: 500 OINTMENT TOPICAL at 20:16

## 2024-05-26 RX ADMIN — OLANZAPINE 5 MG: 5 TABLET, FILM COATED ORAL at 20:17

## 2024-05-26 RX ADMIN — BACITRACIN 1 APPLICATION: 500 OINTMENT TOPICAL at 08:23

## 2024-05-26 RX ADMIN — FLUVOXAMINE MALEATE 25 MG: 50 TABLET ORAL at 20:17

## 2024-05-26 RX ADMIN — FLUVOXAMINE MALEATE 25 MG: 50 TABLET ORAL at 08:23

## 2024-05-26 ASSESSMENT — PAIN - FUNCTIONAL ASSESSMENT
PAIN_FUNCTIONAL_ASSESSMENT: 0-10
PAIN_FUNCTIONAL_ASSESSMENT: 0-10

## 2024-05-26 ASSESSMENT — PAIN SCALES - GENERAL
PAINLEVEL_OUTOF10: 0 - NO PAIN
PAINLEVEL_OUTOF10: 0 - NO PAIN

## 2024-05-26 ASSESSMENT — COLUMBIA-SUICIDE SEVERITY RATING SCALE - C-SSRS
6. HAVE YOU EVER DONE ANYTHING, STARTED TO DO ANYTHING, OR PREPARED TO DO ANYTHING TO END YOUR LIFE?: NO
1. SINCE LAST CONTACT, HAVE YOU WISHED YOU WERE DEAD OR WISHED YOU COULD GO TO SLEEP AND NOT WAKE UP?: NO
2. HAVE YOU ACTUALLY HAD ANY THOUGHTS OF KILLING YOURSELF?: NO

## 2024-05-26 NOTE — NURSING NOTE
"  Pt was very pleasant during assessment. Pt rated anxiety and depression 7/10. Pt denied SI/HI, and AVH, and rated pain 2/10 head ache, PRN tylenol given.Pt stated her goal is to \"sleep through the night,\" and her coping skill is \"reading.\" Pt stated her two strengths are \"smart and good musician.\" Pt took all scheduled nighttime medications without any issues. Pt is currently sleeping in bed without any obvious signs or symptoms of distress. No new orders to carry out at this time. Q15 minute safety checks maintained.      "

## 2024-05-26 NOTE — PROGRESS NOTES
"Ruthy Mcrae is a 28 y.o. female on day 1 of admission presenting with Major depressive disorder, recurrent (CMS-HCC).    Subjective   The patient was seen and examined, discussed in team report this morning. Reviewed chart and vital signs overnight.  Reviewed history and physical. Reviewed previous notes. Discussed with nursing staff. No agitated behavioral issues reported. Attended groups. Eats well.  Pt slept 8.5 hours last night Unbroken. PT reports she slept well last night.  Pt reports feeling better today, noticed that her heart beat is not rapid, her body is calmer though she still feels ruminating. She   rates depressed mood at 7/10 and anxiety at 5/10. Pt denies SI today and feeling hopeful  Pt is compliant with medications, patient noted little heart burn in AM after taking medication. Otherwise, denied drug side effects. Will continue to monitor      Objective   Mental Status Exam:   General: 29 yo CF hx recurrent MDD, OCD, anxiety, admitted for worsening of depression and SI, s/p recent psychiatric inpt in April 2024, s/p excessive use of medication for sleep    Appearance: Appears stated age.    Attitude: cooperative, calm    Behavior: Appropriate eye contact.   Motor Activity: No TD. Normal gait/station. normal muscle tone/bulk.   Speech: Normal rate, lower volume, monotone, spontaneous, fluent.   Mood: 5/25: \"sad\", struggling with increased depression” and “suicidal ideation”   5/26: feeling better today, noticed that her heart beat is not rapid, her body is calmer though she still feels ruminating. She rates depressed mood at 7/10 and anxiety at 5/10   Affect: depressed, more reactive today   Thought Process: Organized, linear, goal directed.   Thought Content: 5/25: struggling with increased depression” and “suicidal ideation” with plan to overdose but without intention and she wants to get help. 5/26: fleeting SI, no intention, no plan. Feeling more hopeful.    Thought Perception: denies " hallucinations. does not appear to be responding to hallucinatory stimuli   Cognition: Alert, oriented x3. Adequate fund of knowledge. Grossly intact.    Insight: fair; as patient recognizes symptoms of illness and need for recommended treatments.   Judgment: fair; actively seeking help.          LABS:  No results found for this or any previous visit (from the past 24 hour(s)).     Last Recorded Vitals  Visit Vitals  BP 98/62 (BP Location: Right arm, Patient Position: Sitting)   Pulse 74   Temp 36.3 °C (97.3 °F) (Temporal)   Resp 16        Intake/Output last 3 Shifts:  No intake/output data recorded.    Relevant Results  Scheduled medications  bacitracin, 1 Application, Topical, TID  fLuvoxaMINE, 25 mg, oral, BID  norethindrone-e.estradioL-iron, 1 tablet, oral, Daily  OLANZapine, 5 mg, oral, Nightly      Continuous medications     PRN medications  PRN medications: acetaminophen, docusate sodium, LORazepam       Assessment/Plan       Patient is a 29yo  female with psychiatric hx of depression, anxiety, OCD who presented to emergency department 5/24/24 for worsening of depression and SI with a method of overdosing on her medication. She is from [facility] and grew up here so came back home to see her parents 1 week ago due to severe depression. Pt is medically cleared and admitted to [facility] for further psychiatric evaluation and stabilization.     A  MDD recurrent, severe with mild paranoia   OCD  Psychosocial stressors (partner cheated on her, per pt, 6 months ago, triggered current presenation)      PLAN  - Admit to [facility] Inpatient Psychiatry Unit  - Restrict to [place]  - Legal Status: VOLUNTARY  - Suicide/Behavioral/Elopement Precautions  - Collateral from outside resource  - General PRNs: Acetaminophen prn pain, MoM prn constipation, Maalox prn dyspepsia  - DVT prophylaxis: Ambulatory  - Diet: Regular  - Group/Milieu & Music therapy - Coping Strategies, Social  Skills  - MUSIC THERAPY CONSULT - Coping  - OT CONSULT - Safety Assessment  - INTERNAL MEDICINE CONSULT - medical management  - SW CONSULT - COLLATERAL    Labs studies:  BMP, LFT, CBC, TSH, UA, UTox done.  I have reviewed these labs in the emr and or chart.  Continue to monitor patient's response to treatment, including medications.  Monitor for emerging side effects and focus on safety issues and improved thought organization / emotional control.  Encourage compliance with current medication treatment.    Psychotropic Medications recommendations:  Will not resume Lexapro 40mg daily, not effective (it was increased a month ago from 20mg which she has been on for 10 years)   Will not resume risperidone (started a month ago), not effective  Will not resume Remeron 30mg qhs, not effective    New trial:  Fluvoxamine to replace Lexapro and Remeron for depression, OCD;    *starting 25mg BID 5/25; continues for today, mild GI SE, plan to titrate up  Olanzapine to replace risperidone for depression, OCD, rumination   *starting 2.5mg QHS 5/25; continues for today, plan to titrate up    Therapy:   Group/Milieu & Music therapy - Coping Strategies, Social Skills; group and 1:1 options; programming, relaxation techniques, coping skills, music, art.    Consults:  OT consult: safety assessment; cognitive assessment  Internal medicine- medical management  Social work consult: Coping, disposition planning; Follow up outpatient appointments    Medical issues- Medical team to follow, Appreciate Consults    DISPOSITION:   -Collateral from outside resource.  - consulted to assist, collateral, psychosocial issues, and disposition  -ELOS 3-7 days  -outpt referral for psychotherapy  -outpt referral for psychiatric followup    I spent 28 minutes in the professional and overall care of this patient.      Brooklyn Mora MD PhD

## 2024-05-26 NOTE — NURSING NOTE
"The pt was calm and cooperative during the interview that took place in her room away from her peers for privacy. Pt rated her anxiety a 5/10, depression 7/10, denies SI, HI and AVH at this time as well as pain rating it a 0/10. Last bowel movement, \"three days ago\" 5/23/24. Coping skills, \"Reading.\" Goal for the day, \"Go to groups.\" Pt strength, \"I'm good at playing piano.\" The pt was medication compliant with her morning medications. This nurse reminded the pt to contact her mother regarding bringing in her home birth control. The pt verbalized understanding and did place the call to her mother that this nurse observed. Q 15 minute monitoring continued.   "

## 2024-05-26 NOTE — CONSULTS
Nutrition Initial Assessment:   Nutrition Assessment         Medical history per chart:   Ruthy Mcrae is a 28 y.o. female who presented to the ED for suicidal ideation. Per documentation, she is from [facility] and grew up here so came back home to see her parents 1 week ago due to severe depression. There was infidelity in her relationship 6 months ago and it brought up a lot of bad thoughts. She has had body dysmorphia where she feels like she is so ugly and does not want to live anymore because of it.   ~Psychiatry on consult    5/26: No po documentation is recorded. RD reached out to nursing to see how the patient is eating. Nursing reports she ate breakfast and lunch today. Spoke with patient via phone. CBW: 161 lbs standing scale. No wounds. Will continue to monitor intakes and weights.   Appetite is lower she reports the past week, yesterday she ate a small breakfast, skipped lunch, and had salmon for dinner. Reports she is not craving food. Patient reports she did have a period like this during her stressor about 6 months ago, but appetite picked up and resumed to normal. She has lost 10 lbs from 6 months ago, which is non significant. CBW: 161 lbs standing. She does not know if she has lost weight this past week or so. She declined ONS and specific snacks at this time. Patient declines n/v/abd pain, but reports constipation; informed her colace is ordered. Will continue to monitor intakes and weights.     Current Diet: Adult diet Regular    Nutrition Related Findings:   Oral Symptoms: none     GI symptoms: constipation, decreased appetite   BM: Last BM Date: 05/23/24  Wound Type: none (nursing/wound notes provide further details)  Edema: No  Food allergies: NKFA.  Meds/Labs reviewed.    Nutrition Significant Labs:  Results from last 7 days   Lab Units 05/24/24  1649   GLUCOSE mg/dL 100*   SODIUM mmol/L 142   POTASSIUM mmol/L 3.5   CHLORIDE mmol/L 106   CO2 mmol/L 26   BUN mg/dL 11   CREATININE  "mg/dL 0.97   EGFR mL/min/1.73m*2 81   CALCIUM mg/dL 9.5     Anthropometrics:  Height: 177.8 cm (5' 10\")   Weight: 73.4 kg (161 lb 13.1 oz)   BMI (Calculated): 23.22           Weight History:   Wt Readings from Last 10 Encounters:   05/25/24 73.4 kg (161 lb 13.1 oz)-standing scale   05/24/24 72.6 kg (160 lb)      Weight Change %:  Weight History / % Weight Change: CBW: 161 lbs standing scale 5/25, unable to obtain weight history           Nutrition Focused Physical Exam Findings:  defer:      Estimated Needs:   Total Energy Estimated Needs (kCal): 1835 kCal  Method for Estimating Needs: 25 kcal/kg  Total Protein Estimated Needs (g): 73 g  Method for Estimating Needs: 1 g/kg  Total Fluid Estimated Needs (mL):  (8280-1212)  Method for Estimating Needs: 30-35 mL/kg        Nutrition Diagnosis   Nutrition Diagnosis:  Malnutrition Diagnosis  Patient has Malnutrition Diagnosis:  (insufficient data)    Nutrition Diagnosis  Patient has Nutrition Diagnosis: Yes  Diagnosis Status (1): New  Nutrition Diagnosis 1: Inadequate oral intake  Related to (1): depression  As Evidenced by (1): pt reports reduced intakes x 1 week       Nutrition Interventions/Recommendations   Nutrition Interventions and Recommendations:        Nutrition Prescription:  Individualized Nutrition Prescription Provided for : Continue current Regular diet. Encourage po intakes and snacks as patient declined ONS at this time. Obtain weekly weights        Nutrition Interventions:   Food and/or Nutrient Delivery Interventions  Interventions: Meals and snacks  Meals and Snacks: General healthful diet  Goal: consume >50% of meals    Coordination of Nutrition Care by a Nutrition Professional  Collaboration and Referral of Nutrition Care: Collaboration by nutrition professional with other providers  Goal: reached out to nursing to discuss po intakes    Nutrition Education:   Education Documentation  No documentation found.      Nutrition Counseling  Counseling " Theoretical Approach: Other (Comment)  Goal: reviewed menu, ONS       Nutrition Monitoring and Evaluation   Monitoring/Evaluation:   Food/Nutrient Related History Monitoring  Monitoring and Evaluation Plan: Energy intake  Energy Intake: Estimated energy intake  Criteria: meet >75%    Body Composition/Growth/Weight History  Monitoring and Evaluation Plan: Weight  Weight: Measured weight  Criteria: stable    Biochemical Data, Medical Tests and Procedures  Monitoring and Evaluation Plan: Electrolyte/renal panel  Electrolyte and Renal Panel: Magnesium, Phosphorus, Potassium, Sodium  Criteria: wnl    Nutrition Focused Physical Findings  Monitoring and Evaluation Plan: Skin  Skin: Other (Comment)  Criteria: maintain skin integrity            Time Spent/Follow-up Reminder:   Follow Up  Time Spent (min): 30 minutes  Last Date of Nutrition Visit: 05/26/24  Nutrition Follow-Up Needed?: Dietitian to reassess per policy

## 2024-05-26 NOTE — NURSING NOTE
Pt had an uneventful night, pt slept well. No PRNs needed. Pt is currently sleeping in bed without any obvious signs or symptoms of distress. No new orders to carry out at this time. Q15 minute safety checks maintained.

## 2024-05-26 NOTE — CARE PLAN
"The patient's goals for the shift include \"sleep through the night.\"    The clinical goals for the shift include maintain safety    Problem: Risk for Suicide  Goal: Accepts medications as prescribed/needed this shift  Outcome: Progressing  Goal: Identifies supports this shift  Outcome: Progressing  Goal: Makes needs known through verbalization or behaviors this shift  Outcome: Progressing  Goal: No self harm this shift  Outcome: Progressing  Goal: Read Safety Guidelines this shift  Outcome: Progressing  Goal: Complete Mental Health Safety Plan (psychiatry only) this shift  Outcome: Progressing     Problem: Fall/Injury  Goal: Not fall by end of shift  Outcome: Progressing  Goal: Be free from injury by end of the shift  Outcome: Progressing  Goal: Verbalize understanding of personal risk factors for fall in the hospital  Outcome: Progressing  Goal: Verbalize understanding of risk factor reduction measures to prevent injury from fall in the home  Outcome: Progressing  Goal: Use assistive devices by end of the shift  Outcome: Progressing  Goal: Pace activities to prevent fatigue by end of the shift  Outcome: Progressing     Problem: Sensory Perceptual Alteration as Evidenced by  Goal: Cooperates with admission process  Outcome: Progressing  Goal: Patient/Family participate in treatment and discharge plans  Outcome: Progressing  Goal: Patient/Family verbalizes awareness of resources  Outcome: Progressing  Goal: Participates in unit activities  Outcome: Progressing  Goal: Discusses signs/symptoms of illness/treatment options  Outcome: Progressing  Goal: Initiates reality-based interactions  Outcome: Progressing  Goal: Able to discuss content of hallucinations/delusions  Outcome: Progressing  Goal: Notifies staff when experiencing hallucinations/delusions  Outcome: Progressing  Goal: Verbalizes reduction in hallucinations/delusions  Outcome: Progressing  Goal: Will not act on psychotic perception  Outcome: " Progressing  Goal: Understands least restrictive measures  Outcome: Progressing  Goal: Free from restraint events  Outcome: Progressing     Problem: Altered Thought Processes as Evidenced by  Goal: STG - Desires improvement in ability to think and concentrate  Outcome: Progressing  Goal: STG - Participates in Occupational Therapy and other cognitive assessments  Outcome: Progressing     Problem: Potential for Harm to Self or Others  Goal: Cooperates with admission process  Outcome: Progressing  Goal: Participates in unit activities  Outcome: Progressing  Goal: Patient/Family participate in treatment and discharge plans  Outcome: Progressing  Goal: Identifies deescalation techniques  Outcome: Progressing  Goal: Understands least restrictive measures  Outcome: Progressing  Goal: Identifies stressors that lead to harmful behaviors  Outcome: Progressing  Goal: Notifies staff when experiencing harmful thoughts toward self/others  Outcome: Progressing  Goal: Denies harm toward self or others  Outcome: Progressing  Goal: Free from restraint events  Outcome: Progressing     Problem: Educational/Scholastic Disruption  Goal: Meets educational requirements during hospitalization  Outcome: Progressing  Goal: Attends class without disruptive behavior  Outcome: Progressing  Goal: Completes daily assignments  Outcome: Progressing     Problem: Ineffective Coping  Goal: Cooperates with admission process  Outcome: Progressing  Goal: Identifies ineffective coping skills  Outcome: Progressing  Goal: Identifies healthy coping skills  Outcome: Progressing  Goal: Demonstrates healthy coping skills  Outcome: Progressing  Goal: Participates in unit activities  Outcome: Progressing  Goal: Patient/Family participate in treatment and discharge plans  Outcome: Progressing  Goal: Patient/Family verbalizes awareness of resources  Outcome: Progressing  Goal: Understands least restrictive measures  Outcome: Progressing  Goal: Free from restraint  events  Outcome: Progressing     Problem: Alteration in Sleep  Goal: STG - Reports nightly sleep, duration, and quality  Outcome: Progressing  Goal: STG - Identifies sleep hygiene aids  Outcome: Progressing  Goal: STG - Informs staff if unable to sleep  Outcome: Progressing  Goal: STG - Attends breathing and relaxation group  Outcome: Progressing     Problem: Potential for Substance Withdrawal  Goal: Verbalizes signs/symptoms of withdrawal  Outcome: Progressing  Goal: Reports signs/symptoms of withdrawal  Outcome: Progressing  Goal: Free of withdrawal symptoms  Outcome: Progressing     Problem: Anxiety  Goal: Patient/family understands admission protocols  Outcome: Progressing  Goal: Attempts to manage anxiety with help  Outcome: Progressing  Goal: Verbalizes ways to manage anxiety  Outcome: Progressing  Goal: Implements measures to reduce anxiety  Outcome: Progressing  Goal: Free from restraint events  Outcome: Progressing     Problem: Self Care Deficit  Goal: STG - Patient completes hygiene  Outcome: Progressing  Goal: Increase group attendance  Outcome: Progressing  Goal: Accepts need for medications  Outcome: Progressing  Goal: STG - Goes to and eats meals independently in dining room 100% of time  Outcome: Progressing     Problem: Defensive Coping  Goal: Cooperates with admission process  Outcome: Progressing  Goal: Identifies reckless/dangerous behavior  Outcome: Progressing  Goal: Identifies stressors that lead to reckless/dangerous behavior  Outcome: Progressing  Goal: Discusses and identifies healthy coping skills  Outcome: Progressing  Goal: Demonstrates healthy coping skills  Outcome: Progressing  Goal: Identifies appropriate social interaction  Outcome: Progressing  Goal: Demonstrates appropriate social interactions  Outcome: Progressing  Goal: Patient/Family verbalizes awareness of resources  Outcome: Progressing  Goal: Discusses signs/symptoms of illness/treatment options  Outcome: Progressing  Goal:  Patient/Family participate in treatment and discharge plans  Outcome: Progressing  Goal: Understands least restrictive measures  Outcome: Progressing  Goal: Free from restraint events  Outcome: Progressing     Over the shift, the patient did make progress toward the following goals.

## 2024-05-26 NOTE — CARE PLAN
"  Problem: Risk for Suicide  Goal: Accepts medications as prescribed/needed this shift  Outcome: Progressing     Problem: Fall/Injury  Goal: Be free from injury by end of the shift  Outcome: Progressing     Problem: Sensory Perceptual Alteration as Evidenced by  Goal: Participates in unit activities  Outcome: Progressing   The patient's goals for the shift include \"Go to a group\"    The clinical goals for the shift include maintain safety    Over the shift, the patient did not make progress toward the following goals.     "

## 2024-05-27 PROCEDURE — 2500000002 HC RX 250 W HCPCS SELF ADMINISTERED DRUGS (ALT 637 FOR MEDICARE OP, ALT 636 FOR OP/ED): Performed by: PSYCHIATRY & NEUROLOGY

## 2024-05-27 PROCEDURE — 2500000001 HC RX 250 WO HCPCS SELF ADMINISTERED DRUGS (ALT 637 FOR MEDICARE OP): Performed by: NURSE PRACTITIONER

## 2024-05-27 PROCEDURE — 97165 OT EVAL LOW COMPLEX 30 MIN: CPT | Mod: GO | Performed by: OCCUPATIONAL THERAPIST

## 2024-05-27 PROCEDURE — 1240000001 HC SEMI-PRIVATE BH ROOM DAILY

## 2024-05-27 PROCEDURE — 2500000001 HC RX 250 WO HCPCS SELF ADMINISTERED DRUGS (ALT 637 FOR MEDICARE OP): Performed by: PSYCHIATRY & NEUROLOGY

## 2024-05-27 PROCEDURE — 99233 SBSQ HOSP IP/OBS HIGH 50: CPT

## 2024-05-27 RX ADMIN — BACITRACIN 1 APPLICATION: 500 OINTMENT TOPICAL at 20:54

## 2024-05-27 RX ADMIN — BACITRACIN 1 APPLICATION: 500 OINTMENT TOPICAL at 14:48

## 2024-05-27 RX ADMIN — FLUVOXAMINE MALEATE 25 MG: 50 TABLET ORAL at 08:55

## 2024-05-27 RX ADMIN — OLANZAPINE 5 MG: 5 TABLET, FILM COATED ORAL at 20:53

## 2024-05-27 RX ADMIN — BACITRACIN 1 APPLICATION: 500 OINTMENT TOPICAL at 08:55

## 2024-05-27 RX ADMIN — FLUVOXAMINE MALEATE 25 MG: 50 TABLET ORAL at 20:53

## 2024-05-27 RX ADMIN — LORAZEPAM 1 MG: 1 TABLET ORAL at 20:55

## 2024-05-27 RX ADMIN — NORETHINDRONE ACETATE AND ETHINYL ESTRADIOL 1 TABLET: KIT at 08:57

## 2024-05-27 ASSESSMENT — PAIN SCALES - GENERAL
PAINLEVEL_OUTOF10: 0 - NO PAIN
PAINLEVEL_OUTOF10: 0 - NO PAIN

## 2024-05-27 ASSESSMENT — PAIN - FUNCTIONAL ASSESSMENT: PAIN_FUNCTIONAL_ASSESSMENT: 0-10

## 2024-05-27 NOTE — CARE PLAN
"The patient's goals for the shift include \"to attend  groups\"    The clinical goals for the shift include treatment compliant    Over the shift, the patient did not make progress toward the following goals.     Problem: Risk for Suicide  Goal: Accepts medications as prescribed/needed this shift  Outcome: Progressing  Goal: Identifies supports this shift  Outcome: Progressing  Goal: Makes needs known through verbalization or behaviors this shift  Outcome: Progressing  Goal: No self harm this shift  Outcome: Progressing  Goal: Read Safety Guidelines this shift  Outcome: Progressing  Goal: Complete Mental Health Safety Plan (psychiatry only) this shift  Outcome: Progressing     Problem: Fall/Injury  Goal: Not fall by end of shift  Outcome: Progressing  Goal: Be free from injury by end of the shift  Outcome: Progressing  Goal: Verbalize understanding of personal risk factors for fall in the hospital  Outcome: Progressing  Goal: Verbalize understanding of risk factor reduction measures to prevent injury from fall in the home  Outcome: Progressing  Goal: Use assistive devices by end of the shift  Outcome: Progressing  Goal: Pace activities to prevent fatigue by end of the shift  Outcome: Progressing     Problem: Sensory Perceptual Alteration as Evidenced by  Goal: Cooperates with admission process  Outcome: Progressing  Goal: Patient/Family participate in treatment and discharge plans  Outcome: Progressing  Goal: Patient/Family verbalizes awareness of resources  Outcome: Progressing  Goal: Participates in unit activities  Outcome: Progressing  Goal: Discusses signs/symptoms of illness/treatment options  Outcome: Progressing  Goal: Initiates reality-based interactions  Outcome: Progressing  Goal: Able to discuss content of hallucinations/delusions  Outcome: Progressing  Goal: Notifies staff when experiencing hallucinations/delusions  Outcome: Progressing  Goal: Verbalizes reduction in hallucinations/delusions  Outcome: " Progressing  Goal: Will not act on psychotic perception  Outcome: Progressing  Goal: Understands least restrictive measures  Outcome: Progressing  Goal: Free from restraint events  Outcome: Progressing     Problem: Altered Thought Processes as Evidenced by  Goal: STG - Desires improvement in ability to think and concentrate  Outcome: Progressing  Goal: STG - Participates in Occupational Therapy and other cognitive assessments  Outcome: Progressing     Problem: Potential for Harm to Self or Others  Goal: Cooperates with admission process  Outcome: Progressing  Goal: Participates in unit activities  Outcome: Progressing  Goal: Patient/Family participate in treatment and discharge plans  Outcome: Progressing  Goal: Identifies deescalation techniques  Outcome: Progressing  Goal: Understands least restrictive measures  Outcome: Progressing  Goal: Identifies stressors that lead to harmful behaviors  Outcome: Progressing  Goal: Notifies staff when experiencing harmful thoughts toward self/others  Outcome: Progressing  Goal: Denies harm toward self or others  Outcome: Progressing  Goal: Free from restraint events  Outcome: Progressing     Problem: Educational/Scholastic Disruption  Goal: Meets educational requirements during hospitalization  Outcome: Progressing  Goal: Attends class without disruptive behavior  Outcome: Progressing  Goal: Completes daily assignments  Outcome: Progressing     Problem: Ineffective Coping  Goal: Cooperates with admission process  Outcome: Progressing  Goal: Identifies ineffective coping skills  Outcome: Progressing  Goal: Identifies healthy coping skills  Outcome: Progressing  Goal: Demonstrates healthy coping skills  Outcome: Progressing  Goal: Participates in unit activities  Outcome: Progressing  Goal: Patient/Family participate in treatment and discharge plans  Outcome: Progressing  Goal: Patient/Family verbalizes awareness of resources  Outcome: Progressing  Goal: Understands least  restrictive measures  Outcome: Progressing  Goal: Free from restraint events  Outcome: Progressing     Problem: Alteration in Sleep  Goal: STG - Reports nightly sleep, duration, and quality  Outcome: Progressing  Goal: STG - Identifies sleep hygiene aids  Outcome: Progressing  Goal: STG - Informs staff if unable to sleep  Outcome: Progressing  Goal: STG - Attends breathing and relaxation group  Outcome: Progressing     Problem: Potential for Substance Withdrawal  Goal: Verbalizes signs/symptoms of withdrawal  Outcome: Progressing  Goal: Reports signs/symptoms of withdrawal  Outcome: Progressing  Goal: Free of withdrawal symptoms  Outcome: Progressing     Problem: Anxiety  Goal: Patient/family understands admission protocols  Outcome: Progressing  Goal: Attempts to manage anxiety with help  Outcome: Progressing  Goal: Verbalizes ways to manage anxiety  Outcome: Progressing  Goal: Implements measures to reduce anxiety  Outcome: Progressing  Goal: Free from restraint events  Outcome: Progressing     Problem: Self Care Deficit  Goal: STG - Patient completes hygiene  Outcome: Progressing  Goal: Increase group attendance  Outcome: Progressing  Goal: Accepts need for medications  Outcome: Progressing  Goal: STG - Goes to and eats meals independently in dining room 100% of time  Outcome: Progressing     Problem: Defensive Coping  Goal: Cooperates with admission process  Outcome: Progressing  Goal: Identifies reckless/dangerous behavior  Outcome: Progressing  Goal: Identifies stressors that lead to reckless/dangerous behavior  Outcome: Progressing  Goal: Discusses and identifies healthy coping skills  Outcome: Progressing  Goal: Demonstrates healthy coping skills  Outcome: Progressing  Goal: Identifies appropriate social interaction  Outcome: Progressing  Goal: Demonstrates appropriate social interactions  Outcome: Progressing  Goal: Patient/Family verbalizes awareness of resources  Outcome: Progressing  Goal: Discusses  signs/symptoms of illness/treatment options  Outcome: Progressing  Goal: Patient/Family participate in treatment and discharge plans  Outcome: Progressing  Goal: Understands least restrictive measures  Outcome: Progressing  Goal: Free from restraint events  Outcome: Progressing

## 2024-05-27 NOTE — NURSING NOTE
Pt was in her room most of the morning she came out for lunch and took a shower then had a visitor , she attended art group that was done by another nurse. Anxiety 7/10 depression 7/10. Pt denied SI/HI and A/V/H. Safety maintained. Med compliant. Pt contracted for safety with this staff at this time.

## 2024-05-27 NOTE — PROGRESS NOTES
"Ruthy Mcrae is a 28 y.o. year old female patient who is on [facility] admission day 2.      Subjective   Ruthy Mcrae is a 28 y.o. year old female patient who was personally seen and interviewed, and discussed in morning team rounds. The patient was interviewed alone at the end of the hallway (interviewed sitting in a chair), and was easily engaged and cooperative. This ,  reports feeling \"sad\" and currently rates her depression at a 6 out of 10. Endorses suicidal ideation but denies suicidal plans. She also rates her anxiety at a 6 out of 10. Endorses paranoia saying that she believes her family and friends have had conversations behind her back saying that she is unlovable. slept 5 hours last night (broken).           Objective   Mental Status Exam:   General: Appropriately groomed and dressed in hospital attire.   Appearance: Appears stated age.   Attitude: Calm, cooperative.   Behavior: Appropriate eye contact.   Motor Activity: No agitation or retardation. No EPS/TD. Normal gait and station. Normal muscle tone and bulk.   Speech: Regular rate, rhythm, volume and tone, spontaneous, fluent. Non-pressured.   Mood: \"sad\"   Affect: Neutral. Depressed, melancholic, congruent   Thought Process: Organized, linear, goal directed.   Thought Content: endorse suicidal ideation but denies any suicide plans.   Does not endorse homicidal ideation.  Endorses paranoia .    Thought Perception: Does not endorse auditory or visual hallucinations, does not appear to be responding to hallucinatory stimuli.   Cognition: Alert, oriented x 3. No deficits noted. Adequate fund of knowledge. No deficit in recent and remote memory. No deficits in attention, concentration or language.   Insight: Fair-to-good, as patient recognizes symptoms of illness and need for recommended treatments.    Judgment: poor, as patient can make reasonable decisions about ordinary activities of daily living and necessary medical care recommendations. "       LABS:  Results for orders placed or performed during the hospital encounter of 05/25/24 (from the past 96 hour(s))   Glucose, Fasting   Result Value Ref Range    Glucose, Fasting 84 74 - 99 mg/dL   Lipid Panel   Result Value Ref Range    Cholesterol 165 0 - 199 mg/dL    HDL-Cholesterol 39.0 mg/dL    Cholesterol/HDL Ratio 4.2     LDL Calculated 107 (H) <=99 mg/dL    VLDL 19 0 - 40 mg/dL    Triglycerides 96 0 - 149 mg/dL    Non HDL Cholesterol 126 0 - 149 mg/dL        Last Recorded Vitals  Visit Vitals  BP 93/63 (BP Location: Right arm, Patient Position: Sitting)   Pulse 68   Temp 36.4 °C (97.5 °F) (Temporal)   Resp 16        Intake/Output last 3 Shifts:  No intake/output data recorded.    Relevant Results  Scheduled medications  bacitracin, 1 Application, Topical, TID  fLuvoxaMINE, 25 mg, oral, BID  norethindrone-e.estradioL-iron, 1 tablet, oral, Daily  OLANZapine, 5 mg, oral, Nightly      Continuous medications     PRN medications  PRN medications: acetaminophen, docusate sodium, LORazepam               Assessment/Plan   1) Major Depressive Disorder, Recurrent, Severe, With mild paranoia       Plan: 1) Continue fluvoxamine 25 mg bid                2) Continue Olanzapine 2.5 mg qhs                3) Group Therapy    2) Obsessive Compulsive Disorder       Plan: 1) see above    3) Psychosocial stressors       Plan: 1) see above        Pt is a staffed with Dr. Quinton Andrade MD,  Tiffanie Parry DO, PGY-2  [facility]

## 2024-05-27 NOTE — PROGRESS NOTES
Occupational Therapy     REHAB Therapy Assessment     Patient Name: Ruthy Mcrae  MRN: [id number]  Today's Date: 5/27/2024      Activity Assessment:  This 27 y/o female was admitted due to depression and SI. Pt was pleasant and willing to participate. Pt was educated on the role of OT and POC. Pt was encouraged to attend all group sessions during LOS.    Performance and Participation in Areas of Occupation:   Activities of Daily Living/Self Care:  · Self-Care Tasks: pt stated she has been having a hard time taking care of herself the past two weeks but took a shower this morning and is feeling better  · Medication Use: pt stated she has been taking medication as prescribed, she is concerned about the change to all her medications but feels she is feeling better today so is feeling positive about the change  · Follows Medical Advice- pt stated she is trying  · Physical Self-care-pt is very concerned about her appearance. She stated lately she has not been motivated to do anything.   · Rest/Sleep-pt noted she is sleeping excessively      Instrumental Activities of Daily Living:  · Parenting/-none  · Driving/Community Mobility (+) license (+) car  · Finances-pt stated they are tight but she is able to pay her bills  · Home Care/Chores-pt stated she has been neglecting home work    Education and Employment:  · Level of Education HS+ bachelors in Mandarin   · Current Employment- , currently on STD  · Plan for Future Employment-pt would like to get a new job but is unsure of what she would like to do    Leisure/Play:  · Leisure Interests- drawing, sketching, painting, singing, piano, walking, watching TV, reading, hiking    Social Participation/Community Involvement:  · Socializing-pt stated she has been neglecting her friendships lately  · Balanced Relationships-pt stated that she has been putting too much effort into her romantic relationships     Performance in Meaningful Roles:  · Life  "Roles/Goals-pt would like to get to a stable place and feel secure and happy  · Sense of Purposefulness-pt stated she is working on it but has felt hopeless for a while  · Adapts to Change-pt boyfriend went outside of the the relationship 6 mths again and pt is having a hard time coping     Emotional Regulation Skills:  · Emotional Self-care-poor  · Mood Modulation- pt stated \"they have been stably low\"   · Anxiety Management 5/10  · Depression Management 5/10    Cognitive Skills and Task Performance Skills:  · Concentration \"pt stated she has been having a hard time concentrating  · Short Term/Remote Memory WFL  · Orientation A&O x 4  · Perseveration- pt stated she has been perseverating on her body image and self worth  · Delusion/Hallucination-denies    Client Factors:  · Sense of Safety/Security- WFL  · Realistic Expectations-WFL  · Coping Skills- pt stated she struggles with coping skills and would like to learn better strategies. Pt noted she has journaled in the past  · Insight-fair       Encounter Problems       Encounter Problems (Active)       OT Goals       Pt will ID 2 community resources/programs to join/attend after D/C to improve their support system.        Start:  05/27/24    Expected End:  06/10/24            Pt will demo/ID 2-3 ways to improve effectiveness in completing tasks and responsibilities, while focusing attention on the present.         Start:  05/27/24    Expected End:  06/10/24            Pt will explore and ID 1-2 strategies to manage stressors/symptoms of illness/ grief more effectively prior to discharge.         Start:  05/27/24    Expected End:  06/10/24            Pt will explore and ID 1-2 effective methods of expressing feelings, want and needs prior to discharge.         Start:  05/27/24    Expected End:  06/10/24                     Education Documentation  No documentation found.  Education Comments  No comments found.          "

## 2024-05-27 NOTE — PROGRESS NOTES
Occupational Therapy                 Therapy Communication Note    Patient Name: Ruthy Mcrae  MRN: [id number]  Today's Date: 5/27/2024     Discipline: Occupational Therapy    Missed Visit Reason: Missed Visit Reason:  (Pt not available for OT evaluation. Upon first attempt 11:05 pt was speaking with psychiatry resident  and on second attempt 13:19 pt had visitor. No OT evaluation completed at this time.)    Missed Time: Attempt

## 2024-05-27 NOTE — CARE PLAN
"The patient's goals for the shift include :attend groups\"    The clinical goals for the shift include maintain safety    Problem: Risk for Suicide  Goal: Accepts medications as prescribed/needed this shift  Outcome: Progressing  Goal: Identifies supports this shift  Outcome: Progressing  Goal: Makes needs known through verbalization or behaviors this shift  Outcome: Progressing  Goal: No self harm this shift  Outcome: Progressing  Goal: Read Safety Guidelines this shift  Outcome: Progressing  Goal: Complete Mental Health Safety Plan (psychiatry only) this shift  Outcome: Progressing     Problem: Fall/Injury  Goal: Not fall by end of shift  Outcome: Progressing  Goal: Be free from injury by end of the shift  Outcome: Progressing  Goal: Verbalize understanding of personal risk factors for fall in the hospital  Outcome: Progressing  Goal: Verbalize understanding of risk factor reduction measures to prevent injury from fall in the home  Outcome: Progressing  Goal: Use assistive devices by end of the shift  Outcome: Progressing  Goal: Pace activities to prevent fatigue by end of the shift  Outcome: Progressing     Problem: Sensory Perceptual Alteration as Evidenced by  Goal: Cooperates with admission process  Outcome: Progressing  Goal: Patient/Family participate in treatment and discharge plans  Outcome: Progressing  Goal: Patient/Family verbalizes awareness of resources  Outcome: Progressing  Goal: Participates in unit activities  Outcome: Progressing  Goal: Discusses signs/symptoms of illness/treatment options  Outcome: Progressing  Goal: Initiates reality-based interactions  Outcome: Progressing  Goal: Able to discuss content of hallucinations/delusions  Outcome: Progressing  Goal: Notifies staff when experiencing hallucinations/delusions  Outcome: Progressing  Goal: Verbalizes reduction in hallucinations/delusions  Outcome: Progressing  Goal: Will not act on psychotic perception  Outcome: Progressing  Goal: " Understands least restrictive measures  Outcome: Progressing  Goal: Free from restraint events  Outcome: Progressing     Problem: Altered Thought Processes as Evidenced by  Goal: STG - Desires improvement in ability to think and concentrate  Outcome: Progressing  Goal: STG - Participates in Occupational Therapy and other cognitive assessments  Outcome: Progressing     Problem: Potential for Harm to Self or Others  Goal: Cooperates with admission process  Outcome: Progressing  Goal: Participates in unit activities  Outcome: Progressing  Goal: Patient/Family participate in treatment and discharge plans  Outcome: Progressing  Goal: Identifies deescalation techniques  Outcome: Progressing  Goal: Understands least restrictive measures  Outcome: Progressing  Goal: Identifies stressors that lead to harmful behaviors  Outcome: Progressing  Goal: Notifies staff when experiencing harmful thoughts toward self/others  Outcome: Progressing  Goal: Denies harm toward self or others  Outcome: Progressing  Goal: Free from restraint events  Outcome: Progressing     Problem: Educational/Scholastic Disruption  Goal: Meets educational requirements during hospitalization  Outcome: Progressing  Goal: Attends class without disruptive behavior  Outcome: Progressing  Goal: Completes daily assignments  Outcome: Progressing     Problem: Ineffective Coping  Goal: Cooperates with admission process  Outcome: Progressing  Goal: Identifies ineffective coping skills  Outcome: Progressing  Goal: Identifies healthy coping skills  Outcome: Progressing  Goal: Demonstrates healthy coping skills  Outcome: Progressing  Goal: Participates in unit activities  Outcome: Progressing  Goal: Patient/Family participate in treatment and discharge plans  Outcome: Progressing  Goal: Patient/Family verbalizes awareness of resources  Outcome: Progressing  Goal: Understands least restrictive measures  Outcome: Progressing  Goal: Free from restraint events  Outcome:  Progressing     Problem: Alteration in Sleep  Goal: STG - Reports nightly sleep, duration, and quality  Outcome: Progressing  Goal: STG - Identifies sleep hygiene aids  Outcome: Progressing  Goal: STG - Informs staff if unable to sleep  Outcome: Progressing  Goal: STG - Attends breathing and relaxation group  Outcome: Progressing     Problem: Potential for Substance Withdrawal  Goal: Verbalizes signs/symptoms of withdrawal  Outcome: Progressing  Goal: Reports signs/symptoms of withdrawal  Outcome: Progressing  Goal: Free of withdrawal symptoms  Outcome: Progressing     Problem: Anxiety  Goal: Patient/family understands admission protocols  Outcome: Progressing  Goal: Attempts to manage anxiety with help  Outcome: Progressing  Goal: Verbalizes ways to manage anxiety  Outcome: Progressing  Goal: Implements measures to reduce anxiety  Outcome: Progressing  Goal: Free from restraint events  Outcome: Progressing     Problem: Self Care Deficit  Goal: STG - Patient completes hygiene  Outcome: Progressing  Goal: Increase group attendance  Outcome: Progressing  Goal: Accepts need for medications  Outcome: Progressing  Goal: STG - Goes to and eats meals independently in dining room 100% of time  Outcome: Progressing     Problem: Defensive Coping  Goal: Cooperates with admission process  Outcome: Progressing  Goal: Identifies reckless/dangerous behavior  Outcome: Progressing  Goal: Identifies stressors that lead to reckless/dangerous behavior  Outcome: Progressing  Goal: Discusses and identifies healthy coping skills  Outcome: Progressing  Goal: Demonstrates healthy coping skills  Outcome: Progressing  Goal: Identifies appropriate social interaction  Outcome: Progressing  Goal: Demonstrates appropriate social interactions  Outcome: Progressing  Goal: Patient/Family verbalizes awareness of resources  Outcome: Progressing  Goal: Discusses signs/symptoms of illness/treatment options  Outcome: Progressing  Goal: Patient/Family  participate in treatment and discharge plans  Outcome: Progressing  Goal: Understands least restrictive measures  Outcome: Progressing  Goal: Free from restraint events  Outcome: Progressing     Over the shift, the patient did make progress toward the following goals.

## 2024-05-27 NOTE — NURSING NOTE
"Pt was very pleasant during assessment. Pt rated anxiety and depression 6/10. Pt denied SI/HI, and AVH, and rated pain 2/10 head ache, PRN tylenol given.Pt stated her goal is to \"attend groups\" and her coping skill is \"breathing.\" Pt stated her two strengths are \"fast reader and good at talking to people.\" Pt took all scheduled nighttime medications without any issues. No PRNs. Pt is currently sleeping in bed without any obvious signs or symptoms of distress. No new orders to carry out at this time. Q15 minute safety checks maintained.       "

## 2024-05-28 PROCEDURE — 1240000001 HC SEMI-PRIVATE BH ROOM DAILY

## 2024-05-28 PROCEDURE — 99232 SBSQ HOSP IP/OBS MODERATE 35: CPT | Performed by: PSYCHIATRY & NEUROLOGY

## 2024-05-28 PROCEDURE — 97150 GROUP THERAPEUTIC PROCEDURES: CPT | Mod: GO,CO

## 2024-05-28 PROCEDURE — 2500000001 HC RX 250 WO HCPCS SELF ADMINISTERED DRUGS (ALT 637 FOR MEDICARE OP): Performed by: NURSE PRACTITIONER

## 2024-05-28 PROCEDURE — 2500000001 HC RX 250 WO HCPCS SELF ADMINISTERED DRUGS (ALT 637 FOR MEDICARE OP): Performed by: PSYCHIATRY & NEUROLOGY

## 2024-05-28 PROCEDURE — 2500000002 HC RX 250 W HCPCS SELF ADMINISTERED DRUGS (ALT 637 FOR MEDICARE OP, ALT 636 FOR OP/ED): Performed by: PSYCHIATRY & NEUROLOGY

## 2024-05-28 RX ADMIN — FLUVOXAMINE MALEATE 25 MG: 50 TABLET ORAL at 21:51

## 2024-05-28 RX ADMIN — FLUVOXAMINE MALEATE 25 MG: 50 TABLET ORAL at 09:41

## 2024-05-28 RX ADMIN — OLANZAPINE 5 MG: 5 TABLET, FILM COATED ORAL at 21:51

## 2024-05-28 RX ADMIN — LORAZEPAM 1 MG: 1 TABLET ORAL at 21:52

## 2024-05-28 RX ADMIN — BACITRACIN 1 APPLICATION: 500 OINTMENT TOPICAL at 17:23

## 2024-05-28 RX ADMIN — NORETHINDRONE ACETATE AND ETHINYL ESTRADIOL 1 TABLET: KIT at 09:00

## 2024-05-28 RX ADMIN — BACITRACIN 1 APPLICATION: 500 OINTMENT TOPICAL at 21:51

## 2024-05-28 RX ADMIN — BACITRACIN 1 APPLICATION: 500 OINTMENT TOPICAL at 09:41

## 2024-05-28 ASSESSMENT — PAIN SCALES - GENERAL
PAINLEVEL_OUTOF10: 0 - NO PAIN
PAINLEVEL_OUTOF10: 0 - NO PAIN

## 2024-05-28 ASSESSMENT — COLUMBIA-SUICIDE SEVERITY RATING SCALE - C-SSRS
6. HAVE YOU EVER DONE ANYTHING, STARTED TO DO ANYTHING, OR PREPARED TO DO ANYTHING TO END YOUR LIFE?: NO
2. HAVE YOU ACTUALLY HAD ANY THOUGHTS OF KILLING YOURSELF?: NO
1. SINCE LAST CONTACT, HAVE YOU WISHED YOU WERE DEAD OR WISHED YOU COULD GO TO SLEEP AND NOT WAKE UP?: NO

## 2024-05-28 ASSESSMENT — PAIN - FUNCTIONAL ASSESSMENT
PAIN_FUNCTIONAL_ASSESSMENT: 0-10
PAIN_FUNCTIONAL_ASSESSMENT: 0-10

## 2024-05-28 NOTE — PROGRESS NOTES
Occupational Therapy     REHAB Therapy Assessment & Treatment    Patient Name: Ruthy Mcrae  MRN: [id number]  Today's Date: 5/28/2024      Activity Assessment:      Community Resources and Personal Supports Group: [id number]  Leisure Skills/ Social Interaction Group: [id number]  Cognitive Task/ Team Collaboration Group: [id number]    2/3 Groups attended        Pt present and engages in all groups excluding cog task group as pt with the provider at that time. During attended groups, pt initially sits away from her peers appearing guarded in nature. As group continues, pt appears more comfortable and ultimately ends up joining the rest of her peers at the same table. Pt is attentive, insightful, and able to respond to community resource questions appropriately 100% of the time. Pt with G understanding of education provided as well as receptive 100% of the time. Pt also able to share X1 positive support to increase awareness with need for helpr for future stress for home carry over. During leisure task, pt with improved mood/brighter affect and more interest in the task. Pt with improvement this date toward OT goals as evidenced above. Pt would benefit from continued OT services in order to improve overall self-esteem, personal confidence and positive supports for safe transition at discharge.        Encounter Problems       Encounter Problems (Active)       OT Goals       Pt will ID 2 community resources/programs to join/attend after D/C to improve their support system.  (Progressing)       Start:  05/27/24    Expected End:  06/10/24            Pt will demo/ID 2-3 ways to improve effectiveness in completing tasks and responsibilities, while focusing attention on the present.   (Progressing)       Start:  05/27/24    Expected End:  06/10/24            Pt will explore and ID 1-2 strategies to manage stressors/symptoms of illness/ grief more effectively prior to discharge.   (Progressing)       Start:  05/27/24     Expected End:  06/10/24            Pt will explore and ID 1-2 effective methods of expressing feelings, want and needs prior to discharge.   (Progressing)       Start:  05/27/24    Expected End:  06/10/24                       Additional Comments:    DUQUE collaborated with patients nurse and charge nurse throughout the day to provide appropriate support and encouragement to attend groups. Pt up on unit when DUQUE left last group of the day. All needs met.

## 2024-05-28 NOTE — GROUP NOTE
"Group Topic: Open Recreation   Group Date: 5/28/2024  Start Time: 1500  End Time: 1610  Facilitators: DIANDRA NegroS   Department: [facility] REHAB THERAPY VIRTUAL    Number of Participants: 7   Group Focus: coping skills, leisure skills, and social skills  Treatment Modality: Other: Recreational Therapy   Interventions utilized were exploration, leisure development, and support  Purpose: coping skills and other: leisure awareness, motor skills, creative expression, social engagement     Name: Ruthy Mcrae YOB: 1995   MR: [id number]      Facilitator: Recreational Therapist  Level of Participation: active  Quality of Participation: appropriate/pleasant, cooperative, and engaged  Interactions with others: appropriate  Mood/Affect: appropriate and brightens with interaction  Triggers (if applicable): n/a  Cognition: coherent/clear  Progress: Moderate  Comments: Patients were gathered in the group area and encouraged to engage in a leisure activity. Options included a variety of creative activities from the unit \"Art Cart\", the Aqua[employer]ling[employer], and song choices.  This group offers an opportunity to work on cognitive skills, promotes positive social interaction, and leisure awareness.    Pt engaged in a velvet coloring project. She was calm, pleasant, and cooperative throughout.     Plan: continue with services      "

## 2024-05-28 NOTE — CARE PLAN
Discussed in Treatment team today;  Met with patient and she stated she wants DBT therapy, will go to live with her parents when discharged; not yet at baseline. Sw to follow.

## 2024-05-28 NOTE — PROGRESS NOTES
"Ruthy Mcrae is a 28 y.o. female on day 3 of admission presenting with Major depressive disorder, recurrent (CMS-HCC).      Subjective   The patient was seen and examined. I reviewed the chart and vital signs from overnight. I reviewed previous notes. I reviewed medications, administered overnight and their reported benefits or side effects.  Patient reviewed her past psychiatric history, leandro in high school, ocd features in high school, rituals and thoughts, ruminating, prevalent and increased in several spikes since 2014, through college, during 2 study abroad courses in [facility] and [place]. One admission in [facility] for mdd. Last saw psychiatry in 2020, none since they retired, pcp took over. Has had initial success on lexapro 40mg for over 10 years, weaned off once, with relapse in symptoms. Restarted lexapro with Remeron in 2022, new sx of pmdd in 2023. Clonidine  reported as effective for anxiety with one prior episode of lower bp, was stopped. Remeron reported as over sedating, with weight gain, leading to abnormal  food intake and food aversion. She said that she \"does not know if bf is breaking up with her\". She said she is sleeping better 12 hours a night.     Objective     Last Recorded Vitals  Blood pressure 112/78, pulse 60, temperature 36.5 °C (97.7 °F), temperature source Temporal, resp. rate 18, height 1.778 m (5' 10\"), weight 73.9 kg (162 lb 14.7 oz), SpO2 98%.    Review of Systems    Physical Exam  Mental Status Exam:   General: CF with ocd, anxiety, depression   Appearance: Appears stated age, in own attire fair grooming and hygiene  Attitude: cooperative, calm, engaged in her care   Behavior: Appropriate eye contact, attends groups  Motor Activity: No eps or td. Normal gait/station. normal muscle tone/bulk.  Speech: Normal rate, volume, tone, spontaneous, fluent.  Mood: depression 5 and anxiety 5    Affect: bright, reactive, anxious  Thought Process: Organized, linear, goal directed.  Thought " Content: denied auditory and visual hallucinations, elicted delusion level preoccupation that others are talking about her, despite being told that it did not happen  Thought Perception: denies hallucinations. does not appear to be responding to hallucinatory stimuli  Cognition: Alert, oriented x3. Adequate fund of knowledge. Grossly intact.   Insight: fair; as patient recognizes symptoms of illness and need for recommended treatments.  Judgment: fair; actively seeking help.     IMPRESSION:  SI  COLLIN  Major Depressive disorder, severe with paranoid delusions  OCD     Plan:    Monitor sleep, anxiety  Luvox 25mg bid  Zyprexa 5mg at bedtime  Milieu therapy    I spent 25 minutes in the professional and overall care of this patient.      Brittani Bernabe MD

## 2024-05-28 NOTE — NURSING NOTE
"Pt was assessed this morning while in her room. She presents as pleasant and cooperative but states she \" felt tired\" this morning. Pt rates anxiety and depression 5/10 with no SI/HI or AVH reported. Pt has been in her room most of the afternoon after visitation. She denies pain or discomfort and her appetite has been good. Pt reports last BM on 5/27. Her goal for today was \" go to one group\". Will continue to monitor for safety and encourage group participation.  "

## 2024-05-28 NOTE — GROUP NOTE
Group Topic: Spiritual/Devotional/Thought of the Day   Group Date: 5/28/2024  Start Time: 0730  End Time: 0810  Facilitators: MINA Negro   Department: [facility] REHAB THERAPY VIRTUAL    Number of Participants: 3   Group Focus: daily focus, goals, personal responsibility, and social skills  Treatment Modality: Other: Recreational Therapy   Interventions utilized were exploration and support  Purpose: insight or knowledge and other: personal goals    Name: Ruthy Mcrae YOB: 1995   MR: [id number]      Facilitator: Recreational Therapist  Level of Participation: did not attend  Progress: None  Comments: Patients were provided with the Thought of the Day reading - “My actions today should reflect my concerns and be appropriate to the need.” Patients were given an opportunity to share their thoughts and encouraged to create a personal goal based on the reading.    Patient declined invitation to group activity at this time. Patient will continue to be provided with opportunities to enhance leisure skills and/or coping mechanisms.    Plan: continue with services

## 2024-05-28 NOTE — GROUP NOTE
"Group Topic: Chemical Dependency - Dual Diagnosis   Group Date: 5/28/2024  Start Time: 1610  End Time: 1705  Facilitators: MINA Negro   Department: [facility] REHAB THERAPY VIRTUAL    Number of Participants: 7   Group Focus: chemical dependency issues, dual diagnosis, and personal responsibility  Treatment Modality: Other: Recreational Therapy   Interventions utilized were exploration, patient education, and support  Purpose: insight or knowledge, self-care, and other: building healthy habits     Name: Ruthy Mcrae YOB: 1995   MR: [id number]      Facilitator: Recreational Therapist  Level of Participation: did not attend  Progress: None  Comments: Patients were provided with the \"Building New Habits\" handout and we had discussion about the importance/difference between setting personal goals and engaging in healthy habits to work towards achieving our overall goals. The handout included several concepts including (differentiating between goals/habits, small changes, update environment, tie new habits to other activities, practice, tell someone, track new habit, and celebrate success). Patients were also provided with the “Habit Plan” handout. Patients were shown a step-by-step process on how to connect a new habit to an existing habit and reward success to reinforce the new habit.    Patient declined invitation to group activity at this time. Patient will continue to be provided with opportunities to enhance leisure skills and/or coping mechanisms.    Plan: continue with services      "

## 2024-05-28 NOTE — CARE PLAN
Problem: Risk for Suicide  Goal: No self harm this shift  Outcome: Progressing     Problem: Fall/Injury  Goal: Not fall by end of shift  Outcome: Progressing  Goal: Be free from injury by end of the shift  Outcome: Progressing  Goal: Verbalize understanding of personal risk factors for fall in the hospital  Outcome: Progressing     Problem: Potential for Harm to Self or Others  Goal: Free from restraint events  Outcome: Progressing     Problem: Ineffective Coping  Goal: Identifies healthy coping skills  Outcome: Progressing  Goal: Participates in unit activities  Outcome: Progressing     Problem: Potential for Substance Withdrawal  Goal: Free of withdrawal symptoms  Outcome: Progressing     Problem: Anxiety  Goal: Implements measures to reduce anxiety  Outcome: Progressing  Goal: Free from restraint events  Outcome: Progressing

## 2024-05-28 NOTE — CARE PLAN
"The patient's goals for the shift include \"go to 1 group\"    The clinical goals for the shift include maintain safety    Over the shift, the patient did not make progress toward the following goals. Barriers to progression include acute illness. Recommendations to address these barriers include continue to take medications as ordered.    "

## 2024-05-28 NOTE — NURSING NOTE
Pt had uneventful night. No changes. Upon assessment pt reported 8/10 anxiety, 5/10 depression, and denies SI/HI/AH/VH and pain. Pt states her coping skill is journaling and goal is to improve her confidence. She reports being moderately social and attending some groups. Pt is quiet, calm and cooperative. Following a phone call this evening, she was tearful and visibly upset. Prn ativan administered. Vitals stable. No further s/s of distress.

## 2024-05-29 LAB
ATRIAL RATE: 65 BPM
P AXIS: 63 DEGREES
P OFFSET: 206 MS
P ONSET: 143 MS
PR INTERVAL: 150 MS
Q ONSET: 218 MS
QRS COUNT: 11 BEATS
QRS DURATION: 90 MS
QT INTERVAL: 396 MS
QTC CALCULATION(BAZETT): 411 MS
QTC FREDERICIA: 406 MS
R AXIS: 54 DEGREES
T AXIS: 33 DEGREES
T OFFSET: 416 MS
VENTRICULAR RATE: 65 BPM

## 2024-05-29 PROCEDURE — 2500000002 HC RX 250 W HCPCS SELF ADMINISTERED DRUGS (ALT 637 FOR MEDICARE OP, ALT 636 FOR OP/ED): Performed by: PSYCHIATRY & NEUROLOGY

## 2024-05-29 PROCEDURE — 2500000001 HC RX 250 WO HCPCS SELF ADMINISTERED DRUGS (ALT 637 FOR MEDICARE OP): Performed by: PSYCHIATRY & NEUROLOGY

## 2024-05-29 PROCEDURE — 1240000001 HC SEMI-PRIVATE BH ROOM DAILY

## 2024-05-29 PROCEDURE — 97150 GROUP THERAPEUTIC PROCEDURES: CPT | Mod: GO,CO

## 2024-05-29 PROCEDURE — 2500000001 HC RX 250 WO HCPCS SELF ADMINISTERED DRUGS (ALT 637 FOR MEDICARE OP): Performed by: NURSE PRACTITIONER

## 2024-05-29 PROCEDURE — 99232 SBSQ HOSP IP/OBS MODERATE 35: CPT | Performed by: PSYCHIATRY & NEUROLOGY

## 2024-05-29 RX ORDER — HYDROXYZINE PAMOATE 25 MG/1
25 CAPSULE ORAL EVERY 6 HOURS PRN
Status: DISCONTINUED | OUTPATIENT
Start: 2024-05-29 | End: 2024-05-29

## 2024-05-29 RX ORDER — HYDROXYZINE PAMOATE 25 MG/1
25 CAPSULE ORAL EVERY 6 HOURS PRN
Status: DISCONTINUED | OUTPATIENT
Start: 2024-05-29 | End: 2024-06-08

## 2024-05-29 RX ORDER — HYDROXYZINE PAMOATE 50 MG/1
50 CAPSULE ORAL EVERY 6 HOURS PRN
Status: DISCONTINUED | OUTPATIENT
Start: 2024-05-29 | End: 2024-06-10 | Stop reason: HOSPADM

## 2024-05-29 RX ADMIN — NORETHINDRONE ACETATE AND ETHINYL ESTRADIOL 1 TABLET: KIT at 09:00

## 2024-05-29 RX ADMIN — FLUVOXAMINE MALEATE 25 MG: 50 TABLET ORAL at 20:16

## 2024-05-29 RX ADMIN — BACITRACIN 1 APPLICATION: 500 OINTMENT TOPICAL at 20:16

## 2024-05-29 RX ADMIN — HYDROXYZINE PAMOATE 50 MG: 50 CAPSULE ORAL at 15:06

## 2024-05-29 RX ADMIN — BACITRACIN 1 APPLICATION: 500 OINTMENT TOPICAL at 09:37

## 2024-05-29 RX ADMIN — OLANZAPINE 5 MG: 5 TABLET, FILM COATED ORAL at 20:17

## 2024-05-29 RX ADMIN — BACITRACIN 1 APPLICATION: 500 OINTMENT TOPICAL at 15:05

## 2024-05-29 RX ADMIN — FLUVOXAMINE MALEATE 25 MG: 50 TABLET ORAL at 09:38

## 2024-05-29 ASSESSMENT — PAIN - FUNCTIONAL ASSESSMENT
PAIN_FUNCTIONAL_ASSESSMENT: 0-10
PAIN_FUNCTIONAL_ASSESSMENT: 0-10

## 2024-05-29 ASSESSMENT — PAIN SCALES - GENERAL
PAINLEVEL_OUTOF10: 0 - NO PAIN
PAINLEVEL_OUTOF10: 0 - NO PAIN

## 2024-05-29 NOTE — NURSING NOTE
"Pt was assessed this morning I the hallway away from peers for privacy. She was pleasant and engaged with assessment. Pt reports \" slept well last night\" but \" felt groggy\" this morning. She rates anxiety 7/10 and depression 5/10 with no SI/HI or AVH reported. We discussed her relationship with significant other this morning as to follow up with discussion pt had with prior shift nurse. Pt states she feels \" not good enough all the time\". We discussed placing the focus on herself and not the relationship. Her goal for today was \" write ten positive things about myself\" as suggested by this nurse. She was in agreement with this. Pt was observed attending groups today and has been medication compliant. New order for Hydroxazine 25mg PRN for mild anxiety and Hydroxazine 50mg for moderate anxiety. Will continue to monitor for safety and encourage group participation.  "

## 2024-05-29 NOTE — GROUP NOTE
Group Topic: Leisure Skills   Group Date: 5/29/2024  Start Time: 1600  End Time: 1650  Facilitators: MINA Elena   Department: [facility] REHAB THERAPY VIRTUAL    Number of Participants: 7   Group Focus: concentration and leisure skills  Treatment Modality: Recreation Therapy  Interventions utilized were: [place], leisure development  Purpose: Leisure Awareness, Cognitive Stimulation, Pleasurable Activity    Name: Ruthy Mcrae YOB: 1995   MR: [id number]      Facilitator: Recreational Therapist  Level of Participation: minimal, removed from group  Quality of Participation: appropriate/pleasant and cooperative  Interactions with others: appropriate  Mood/Affect:  calm  Triggers (if applicable): N/A  Cognition:  capable  Progress: Minimal  Comments: This session involved a leisure activity called Xeebel.  The activity involved cognitive tasks, social interactions, healthy competition, leisure awareness, and a pleasurable experience. All participants were encouraged to listen to the rules, ask questions as needed, and participate in the activity to the best of their abilities.    Patient completed 1 or 2 rounds of the game prior to being removed by her physician.      Plan: continue with services
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
## 2024-05-29 NOTE — GROUP NOTE
"Group Topic: Social Skills   Group Date: 5/29/2024  Start Time: 1400  End Time: 1500  Facilitators: MINA Elena   Department: [facility] REHAB THERAPY VIRTUAL    Number of Participants: 9   Group Focus: communication, personal responsibility, and social skills  Treatment Modality: Recreation Therapy  Interventions utilized were: How to Set Healthy Boundaries,  clarification, exploration, and support  Purpose: coping skills, communication skills, and self-care    Name: Ruthy Mcrae YOB: 1995   MR: [id number]      Facilitator: Recreational Therapist  Level of Participation:  attended, did not participate  Quality of Participation: quiet, withdrawn, and poor  Interactions with others:  minimal  Mood/Affect:  calm, disinterested  Triggers (if applicable): N/A  Cognition:  inattentive   Progress: None  Comments: This session provided education and discussion related to \"Setting Healthy Boundaries\". Participants were educated on the seven types of boundaries, tips for setting healthy boundaries, what to say examples, and understanding the differences between healthy and unhealthy boundaries. Patients were encouraged to share personal examples and process how they may utilize the education provided.      Patient was observed working on a jigsaw puzzle throughout the session. She did not complete any group tasks.     Plan: continue with services      "

## 2024-05-29 NOTE — PROGRESS NOTES
"Occupational Therapy     REHAB Therapy Assessment & Treatment    Patient Name: Ruthy Mcrae  MRN: [id number]  Today's Date: 5/29/2024      Activity Assessment:     Anger ID/Management and Emotion Regulation Group: [id number]  Self-Forgiveness and Quality of Life Group: [id number]  Cognitive Task/ Social Interaction Skills Group: [id number]    3/3 Groups attended     Pt present and engages well in all groups this date with much improved mood, improved confidence when sharing as well as increased social interaction as compared to previous groups. Pt with G understanding of anger iceberg activity as well as able to share different emotions she is observed as on the outside as well as what she's actually struggling with on the inside. Pt appropriately acknowledges \"needing to learn self forgiveness\" as well as receptive to education and handouts provided on the same topic. During cog task group, pt with much improved enthusiasm as well as confidence when sharing aloud. Pt takes appropriate leadership role throughout cog task group and able to share understanding of how same activity could be a positive distraction to stress. Pt continues to make improvement toward OT goals as evidenced above.     Encounter Problems       Encounter Problems (Active)       OT Goals       Pt will ID 2 community resources/programs to join/attend after D/C to improve their support system.  (Progressing)       Start:  05/27/24    Expected End:  06/10/24            Pt will demo/ID 2-3 ways to improve effectiveness in completing tasks and responsibilities, while focusing attention on the present.   (Progressing)       Start:  05/27/24    Expected End:  06/10/24            Pt will explore and ID 1-2 strategies to manage stressors/symptoms of illness/ grief more effectively prior to discharge.   (Progressing)       Start:  05/27/24    Expected End:  06/10/24            Pt will explore and ID 1-2 effective methods of expressing feelings, want and " needs prior to discharge.   (Progressing)       Start:  05/27/24    Expected End:  06/10/24                     Additional Comments:    DUQUE collaborated with patients nurse and charge nurse throughout the day to provide appropriate support and encouragement to attend groups. Pt up on unit when DUQUE left last group of the day. All needs met.

## 2024-05-29 NOTE — CARE PLAN
"The patient's goals for the shift include \"try to feel less anxiety\"    The clinical goals for the shift include medication compliance    Over the shift, the patient did  make progress toward the following goals    Problem: Risk for Suicide  Goal: Accepts medications as prescribed/needed this shift  Outcome: Progressing     Problem: Risk for Suicide  Goal: Makes needs known through verbalization or behaviors this shift  Outcome: Progressing     Problem: Risk for Suicide  Goal: No self harm this shift  Outcome: Progressing     Problem: Fall/Injury  Goal: Be free from injury by end of the shift  Outcome: Progressing     Problem: Potential for Harm to Self or Others  Goal: Identifies stressors that lead to harmful behaviors  Outcome: Progressing     Problem: Potential for Harm to Self or Others  Goal: Notifies staff when experiencing harmful thoughts toward self/others  Outcome: Progressing     Problem: Alteration in Sleep  Goal: STG - Reports nightly sleep, duration, and quality  Outcome: Progressing     Problem: Alteration in Sleep  Goal: STG - Identifies sleep hygiene aids  Outcome: Progressing     Problem: Alteration in Sleep  Goal: STG - Informs staff if unable to sleep  Outcome: Progressing     Problem: Self Care Deficit  Goal: Accepts need for medications  Outcome: Progressing     Problem: Self Care Deficit  Goal: STG - Goes to and eats meals independently in dining room 100% of time  Outcome: Progressing     "

## 2024-05-29 NOTE — GROUP NOTE
Group Topic: Self Esteem   Group Date: 5/29/2024  Start Time: 0730  End Time: 0815  Facilitators: MINA Elena   Department: [facility] REHAB THERAPY VIRTUAL    Number of Participants: 7   Group Focus: check in, daily focus, goals, and self-esteem  Treatment Modality: Recreation Therapy  Interventions utilized were: 8 Steps to Improving Your Self-esteem, orientation and support  Purpose: coping skills, insight or knowledge, and self-worth    Name: Ruthy Mcrae YOB: 1995   MR: [id number]      Facilitator: Recreational Therapist  Level of Participation: did not attend  Progress: None  Comments: We discussed some ways to improve one’s self esteem by looking at eight strategies. Patients were asked to provide examples and problem solve through the instructions (handout). Participants were asked to focus on one or two of the provided skills and establish a goal related to it.     Patient remained in their room throughout the session for unknown reasons.     Plan: continue with services

## 2024-05-29 NOTE — CARE PLAN
"The patient's goals for the shift include \" write ten positive things about myself\"    The clinical goals for the shift include maintain safety    Over the shift, the patient did not make progress toward the following goals. Barriers to progression include acute anxiety. Recommendations to address these barriers include continue PRN anxiety medications as needed.     "

## 2024-05-29 NOTE — NURSING NOTE
"Patient is out on the unit and talked on the phone a few different times. Pt  was observed sitting in the lounge at a table with other patients during snack. Pt was engaged in answering assessment questions while sitting on the couch in the gleason. Pt rated anxiety 8, and stated the reason it's an 8 is because she is almost positive that her boyfriend is going to break up with her\". She stated that he has cheated on her in the past 6 months and he has not called her since she has been admitted here.\" Patient talked a long time about how she\" is here from [place] and living with her parents here in [place] until she gets her mental health under control\". Pt rated depression 5, denied si/hi, denied a/v hallucinations. Pt is medication compliant and asked if she had anything for her anxiety. PRN ativan 1mg given orally along with the scheduled night medications.  "

## 2024-05-30 PROCEDURE — 2500000001 HC RX 250 WO HCPCS SELF ADMINISTERED DRUGS (ALT 637 FOR MEDICARE OP): Performed by: PSYCHIATRY & NEUROLOGY

## 2024-05-30 PROCEDURE — 2500000002 HC RX 250 W HCPCS SELF ADMINISTERED DRUGS (ALT 637 FOR MEDICARE OP, ALT 636 FOR OP/ED): Performed by: PSYCHIATRY & NEUROLOGY

## 2024-05-30 PROCEDURE — 97150 GROUP THERAPEUTIC PROCEDURES: CPT | Mod: GO,CO

## 2024-05-30 PROCEDURE — 99232 SBSQ HOSP IP/OBS MODERATE 35: CPT | Performed by: PSYCHIATRY & NEUROLOGY

## 2024-05-30 PROCEDURE — 1240000001 HC SEMI-PRIVATE BH ROOM DAILY

## 2024-05-30 PROCEDURE — 2500000001 HC RX 250 WO HCPCS SELF ADMINISTERED DRUGS (ALT 637 FOR MEDICARE OP): Performed by: NURSE PRACTITIONER

## 2024-05-30 RX ORDER — FLUVOXAMINE MALEATE 50 MG/1
50 TABLET, FILM COATED ORAL DAILY
Status: DISCONTINUED | OUTPATIENT
Start: 2024-05-30 | End: 2024-05-30

## 2024-05-30 RX ORDER — FLUVOXAMINE MALEATE 50 MG/1
25 TABLET, FILM COATED ORAL NIGHTLY
Status: DISCONTINUED | OUTPATIENT
Start: 2024-05-30 | End: 2024-05-31

## 2024-05-30 RX ORDER — OLANZAPINE 2.5 MG/1
2.5 TABLET ORAL NIGHTLY
Status: DISCONTINUED | OUTPATIENT
Start: 2024-05-30 | End: 2024-05-31

## 2024-05-30 RX ORDER — FLUVOXAMINE MALEATE 50 MG/1
50 TABLET, FILM COATED ORAL DAILY
Status: DISCONTINUED | OUTPATIENT
Start: 2024-05-31 | End: 2024-05-31

## 2024-05-30 RX ORDER — FLUVOXAMINE MALEATE 50 MG/1
25 TABLET, FILM COATED ORAL ONCE
Status: COMPLETED | OUTPATIENT
Start: 2024-05-30 | End: 2024-05-30

## 2024-05-30 RX ADMIN — NORETHINDRONE ACETATE AND ETHINYL ESTRADIOL 1 TABLET: KIT at 09:23

## 2024-05-30 RX ADMIN — BACITRACIN 1 APPLICATION: 500 OINTMENT TOPICAL at 09:23

## 2024-05-30 RX ADMIN — FLUVOXAMINE MALEATE 25 MG: 50 TABLET ORAL at 12:34

## 2024-05-30 RX ADMIN — OLANZAPINE 2.5 MG: 2.5 TABLET, FILM COATED ORAL at 20:34

## 2024-05-30 RX ADMIN — BACITRACIN 1 APPLICATION: 500 OINTMENT TOPICAL at 15:22

## 2024-05-30 RX ADMIN — FLUVOXAMINE MALEATE 25 MG: 50 TABLET ORAL at 09:23

## 2024-05-30 RX ADMIN — FLUVOXAMINE MALEATE 25 MG: 50 TABLET ORAL at 20:34

## 2024-05-30 RX ADMIN — BACITRACIN 1 APPLICATION: 500 OINTMENT TOPICAL at 20:35

## 2024-05-30 RX ADMIN — HYDROXYZINE PAMOATE 50 MG: 50 CAPSULE ORAL at 09:32

## 2024-05-30 ASSESSMENT — PAIN SCALES - GENERAL
PAINLEVEL_OUTOF10: 0 - NO PAIN
PAINLEVEL_OUTOF10: 0 - NO PAIN

## 2024-05-30 ASSESSMENT — PAIN - FUNCTIONAL ASSESSMENT
PAIN_FUNCTIONAL_ASSESSMENT: 0-10
PAIN_FUNCTIONAL_ASSESSMENT: 0-10

## 2024-05-30 NOTE — GROUP NOTE
Group Topic: Gross Motor/Balance Skills   Group Date: 5/30/2024  Start Time: 1600  End Time: 1645  Facilitators: DIANDRA ElenaS   Department: [facility] REHAB THERAPY VIRTUAL    Number of Participants: 7   Group Focus: Movement Activity, leisure skills  Treatment Modality: Recreation Therapy  Interventions utilized were: Bocce ball, music, leisure development  Purpose: Leisure Awareness, Social Stimulation, Physical Activity, Pleasurable Activity     Name: Ruthy Mcrae YOB: 1995   MR: [id number]      Facilitator: Recreational Therapist  Level of Participation: did not attend  Progress: None  Comments: This physical activity involved coordinating movements, social interactions, healthy competition, leisure awareness, and a pleasurable experience.     Patient remained in their room throughout the session for unknown reasons.     Plan: continue with services

## 2024-05-30 NOTE — PROGRESS NOTES
"Nutrition Follow-up Note  Patient has Malnutrition Diagnosis:  (insufficient data)   Nutrition History:  Food and Nutrient History: Nursing reports pt has been eating well with encouragement. ONS not indicated at this time. SKin intact, no new weights available to assess for changes over course of admission. Further intervention not indicated at this time. RE-consult if there is a change in patient status.  Energy Intake: Good > 75 %  No Known Allergies        Anthropometrics:  Height: 177.8 cm (5' 10\")   Weight: 73.9 kg (162 lb 14.7 oz)   BMI (Calculated): 23.38             Weight History:   Wt Readings from Last 10 Encounters:   05/26/24 73.9 kg (162 lb 14.7 oz)   05/24/24 72.6 kg (160 lb)       Weight Change %:                Nutrition Focused Physical Exam Findings:    Subcutaneous Fat Loss:      Muscle Wasting:     Edema:   none  Physical Findings:   Skin intact    Nutrition Significant Labs:  CBC Trend:   Results from last 7 days   Lab Units 05/24/24  1649   WBC AUTO x10*3/uL 6.7   RBC AUTO x10*6/uL 4.74   HEMOGLOBIN g/dL 14.5   HEMATOCRIT % 43.1   MCV fL 91   PLATELETS AUTO x10*3/uL 223    , BMP Trend:   Results from last 7 days   Lab Units 05/24/24  1649   GLUCOSE mg/dL 100*   CALCIUM mg/dL 9.5   SODIUM mmol/L 142   POTASSIUM mmol/L 3.5   CO2 mmol/L 26   CHLORIDE mmol/L 106   BUN mg/dL 11   CREATININE mg/dL 0.97      Nutrition Specific Medications:      Current Facility-Administered Medications:     acetaminophen (Tylenol) tablet 650 mg, 650 mg, oral, q4h PRN, Brooklyn Mora MD PhD, 650 mg at 05/25/24 2024    bacitracin ointment 1 Application, 1 Application, Topical, TID, Rosa Elena Pritchett, APRN-CNP, 1 Application at 05/30/24 0923    docusate sodium (Colace) capsule 100 mg, 100 mg, oral, BID PRN, Brooklyn Mora MD PhD    fLuvoxaMINE (Luvox) tablet 25 mg, 25 mg, oral, Nightly, Brittani Bernabe MD    [START ON 5/31/2024] fLuvoxaMINE (Luvox) tablet 50 mg, 50 mg, oral, Daily, Brittani Bernabe MD    hydrOXYzine pamoate " (Vistaril) capsule 25 mg, 25 mg, oral, q6h PRN, Brittani Bernabe MD    hydrOXYzine pamoate (Vistaril) capsule 50 mg, 50 mg, oral, q6h PRN, Brittani Bernabe MD, 50 mg at 05/30/24 0932    norethindrone-e.estradioL-iron (Junel FE 1/20) 1 mg-20 mcg (21)/75 mg (7) per tablet 1 tablet, 1 tablet, oral, Daily, Rosa Elena Pritchett, APRN-CNP, 1 tablet at 05/30/24 0923    OLANZapine (ZyPREXA) tablet 2.5 mg, 2.5 mg, oral, Nightly, Brittani Bernabe MD   Nursing Data Per flowsheet:      Gastrointestinal  Gastrointestinal (WDL): Within Defined Limits (per patient)  Last BM Date: 05/29/24  No intake or output data in the 24 hours ending 05/30/24 1453    Pain Score: 0 - No pain   Dietary Orders (From admission, onward)       Start     Ordered    05/25/24 0923  Adult diet Regular  Diet effective now        Question:  Diet type  Answer:  Regular    05/25/24 0923                     Estimated Needs:   Total Energy Estimated Needs (kCal): 1835 kCal  Method for Estimating Needs: 25 kcal/kg  Total Protein Estimated Needs (g): 73 g  Method for Estimating Needs: 1 g/kg  Total Fluid Estimated Needs (mL):  (9109-9009)  Method for Estimating Needs: 30-35 mL/kg       Nutrition Diagnosis   Malnutrition Diagnosis  Patient has Malnutrition Diagnosis:  (insufficient data)    Nutrition Diagnosis  Patient has Nutrition Diagnosis: Yes  Diagnosis Status (1): New  Nutrition Diagnosis 1: Inadequate oral intake  Related to (1): depression  As Evidenced by (1): pt reports reduced intakes x 1 week       Nutrition Interventions/Recommendations   Nutrition Prescription:  Continue current Regular diet. Encourage po intakes and snacks as patient declined ONS at this time. Obtain weekly weights    Nutrition Interventions:   Interventions: Meals and snacks  Meals and Snacks: General healthful diet  Goal: consume >50% of meals    Collaboration and Referral of Nutrition Care: Collaboration by nutrition professional with other providers  Goal: reached out to nursing to discuss  po intakes      Coordination of Care: nursing staff  Nutrition Education:   Education Documentation  No documentation found.         Recommendations:  Continue with current diet; re-consult if there is a change in patient status         Nutrition Monitoring and Evaluation     Food/Nutrient Related History Monitoring  Monitoring and Evaluation Plan: Energy intake  Energy Intake: Estimated energy intake  Criteria: meet >75%    Body Composition/Growth/Weight History  Monitoring and Evaluation Plan: Weight  Weight: Measured weight  Criteria: stable    Biochemical Data, Medical Tests and Procedures  Monitoring and Evaluation Plan: Electrolyte/renal panel  Electrolyte and Renal Panel: Magnesium, Phosphorus, Potassium, Sodium  Criteria: wnl    Nutrition Focused Physical Findings  Monitoring and Evaluation Plan: Skin  Skin: Other (Comment)  Criteria: maintain skin integrity            Time Spent (min): 15 minutes

## 2024-05-30 NOTE — GROUP NOTE
Group Topic: Goals   Group Date: 5/30/2024  Start Time: 0730  End Time: 0820  Facilitators: MINA Elena   Department: [facility] REHAB THERAPY VIRTUAL    Number of Participants: 5   Group Focus: check in, daily focus, and goals  Treatment Modality: Recreation Therapy  Interventions utilized were: Today Is (Goal Worksheet), assignment, orientation, and support  Purpose: Goal Identification, insight or knowledge and self-care    Name: Ruthy Mcrae YOB: 1995   MR: [id number]      Facilitator: Recreational Therapist  Level of Participation: did not attend  Progress: None  Comments: Participants were provided a handout which included: date, goal, planning to take time today for recovery, planning to take time doing leisure activities, activities to try today, and using the thought for the day to make an individualized goal.     Patient remained in their room throughout the session resting/sleeping.    Plan: continue with services

## 2024-05-30 NOTE — GROUP NOTE
"Group Topic: Dialectical Behavioral Therapy - Distress Tolerance   Group Date: 5/30/2024  Start Time: 1400  End Time: 1450  Facilitators: MINA Elena   Department: [facility] REHAB THERAPY VIRTUAL    Number of Participants: 9   Group Focus: acceptance, coping skills, feeling awareness/expression, and personal responsibility  Treatment Modality: Recreation Therapy  Interventions utilized were: S.T.O.P. and Turning the Mind Skills, exploration and patient education  Purpose: Therapy Education, Stress Management, coping skills and feelings    Name: Ruthy Mcrae YOB: 1995   MR: [id number]      Facilitator: Recreational Therapist  Level of Participation: moderate  Quality of Participation: attentive and cooperative  Interactions with others:  slightly improved, minimal  Mood/Affect: anxious, brightens with interaction, and closed / guarded  Triggers (if applicable): N/A  Cognition:  capable  Progress: Moderate  Comments: This group discussed the S.T.O.P. acronym which is stop, take a step back, observe, and proceed mindfully. Participants were encouraged to share how they may use this skill and when. Group also involved the distress tolerance skill \"turning the mind\" and understanding the step by step process to work at acceptance verse rejection. Steps include: observation, making an inner commitment, practice/doing it again, and developing a plan.      Patient attended most of the session but was removed to meet with her physician. Ms. Mcrae was provided some added encouragement and was more willing to share her thoughts. Patient commented on the skills when asked and was able to identify emotional triggers and barriers to acceptance.    Plan: continue with services      "

## 2024-05-30 NOTE — NURSING NOTE
"[facility]  Pt is doing a puzzle and getting ready to eat he snack  Pt stated her day was \"OK\"  Pt rated her anxiety 5/10  depression 6/10  and denied everything else at this time  Pt stated her goal \"journal and get some sleep\"  her strength \" I play piano\"  and her coping skill \"I journal\"  Pt is appropriate with her answers to the questions at this time  "

## 2024-05-30 NOTE — NURSING NOTE
Pt took her night time medications and went to sleep  Pt slept all night long  Pt had an uneventful night

## 2024-05-30 NOTE — CARE PLAN
"The patient's goals for the shift include :\" I want to journal and get some sleep\"    The clinical goals for the shift include medication compliance    Over the shift, the patient did not make progress toward the following goals. Barriers to progression include lack of medication knowledge . Recommendations to address these barriers include more education in medications.    "

## 2024-05-30 NOTE — PROGRESS NOTES
"Ruthy Mcrae is a 28 y.o. female on day 5 of admission presenting with Major depressive disorder, recurrent (CMS-HCC).      Subjective   The patient was seen and examined. I reviewed the chart and vital signs from overnight. I reviewed previous notes. I reviewed medications, administered overnight and their reported benefits or side effects. Ruthy and I sat down this am and worked on negative cognitions. Patient was given 2 journals, a cbt journal goal and a self care journal. I asked her to write down on the left side of the journal, all neg thoughts since this morning. On the right side of the journal she was asked to respond in reality based responses, not based on her neg thoughts, ideations. We worked on this for 35 minutes. We will resume after lunch. Themes were sub vs obj beauty, what her thoughts were protecting her from, based ideations, of self esteem and rejection sensitivity. We discussed inc luvox to 50mg and 25mg at bedtime and red zyprexa to 2.5mg at bedtime. We are exploring delusions vs false neg but addressable negative ideations, not at a delusional level. We talked about potentially red and dc zyprexa before dc if sleep remains stable, alternative agent for sleep, possibly as patient has self image and does not wish to gain excessive weight.     Objective     Last Recorded Vitals  Blood pressure 92/59, pulse 65, temperature 36.2 °C (97.2 °F), temperature source Temporal, resp. rate 16, height 1.778 m (5' 10\"), weight 73.9 kg (162 lb 14.7 oz), SpO2 98%.    Review of Systems    Physical Exam  Mental Status Exam:   General: CF with ocd, anxiety, depression   Appearance: Appears stated age, in own attire fair grooming and hygiene  Attitude: cooperative, calm, engaged in her care   Behavior: Appropriate eye contact, attends groups  Motor Activity: No eps or td. Normal gait/station. normal muscle tone/bulk.  Speech: Normal rate, volume, tone, spontaneous, fluent.  Mood: depression 6 and anxiety " 5    Affect: reactive, anxious  Thought Process: Organized, linear, goal directed.  Thought Content: denied auditory and visual hallucinations, elicted ocd level thinking not true delusion as when patient is challenged she states she does not really believe she is worthless or not lovable, but has high intensity neg ruminations that impair daily function, self care and precipitation depressive episode with recent SI.   Thought Perception: denies hallucinations. does not appear to be responding to hallucinatory stimuli  Cognition: Alert, oriented x3. Adequate fund of knowledge. Grossly intact.   Insight: fair; as patient recognizes symptoms of illness and need for recommended treatments.  Judgment: fair; actively seeking help.     IMPRESSION:  SI  COLLIN  Major Depressive disorder, severe with paranoid delusions  OCD     Plan:    Monitor sleep, anxiety  Luvox 25mg bid  Zyprexa 5mg at bedtime  Milieu therapy    5/30: luvox decreases enzymatic breakdown of Zyprexa increasing latter drug levels. Reduce Zyprexa to 2.5mg at bedtime and increase luvox to 50mg am and 25mg at bedtime today,  with goal to stop Zyprexa prior to discharge if possible     I spent 25 minutes in the professional and overall care of this patient.      Brittani Bernabe MD

## 2024-05-30 NOTE — CARE PLAN
"Patient out on the unit for meals and groups this shift. Rated anxiety 7/10 due to insecurities about physical appearance and having low self-esteem and depression 6/10. Denied SI/HI. Denied auditory/visual/other hallucinations. No complaints of pain or discomfort. Medication compliant. Able to state positive coping skills such as \"journaling\". The patient's goals for the shift include \"continue to journal and combat my distorted feelings\". Q15 minute checks to be maintained throughout shift for safety.       "

## 2024-05-30 NOTE — PROGRESS NOTES
"Occupational Therapy     REHAB Therapy Assessment & Treatment    Patient Name: Ruthy Mcrae  MRN: [id number]  Today's Date: 5/30/2024      Activity Assessment:   [place] Setting and Follow Through Group: [id number]  Pet Therapy as a Coping Skill Group: [id number]  Self Esteem and Social Interaction Group: [id number]    3/3 Groups attended     Pt present in all groups with continued improved social interaction, willingness and comfort sharing aloud, as well as improving insight. Pt with G understanding of boundary setting education as well as shares \"knowing setting boundaries and following through is important\" Pt then able to create a list of personal boundaries to use as motivation at discharge sharing with improved confidence \"I am not going to give up, I do not want to feel less than, I can control my reactions, and I cannot control how others view me\" During pet therapy group, pt continues to demo improved social interaction and willingness to share her understanding of pet therapy. Pt also with G expression when sharing meaningful stories of past pets and overall continues to demo improvement each date toward OT goals as evidenced above. Pt would benefit from continued OT services in order to improve overall self-esteem, personal confidence and positive supports for safe transition at discharge.         Encounter Problems       Encounter Problems (Active)       OT Goals       Pt will ID 2 community resources/programs to join/attend after D/C to improve their support system.  (Progressing)       Start:  05/27/24    Expected End:  06/10/24            Pt will demo/ID 2-3 ways to improve effectiveness in completing tasks and responsibilities, while focusing attention on the present.   (Progressing)       Start:  05/27/24    Expected End:  06/10/24            Pt will explore and ID 1-2 strategies to manage stressors/symptoms of illness/ grief more effectively prior to discharge.   (Progressing)       Start:  " 05/27/24    Expected End:  06/10/24            Pt will explore and ID 1-2 effective methods of expressing feelings, want and needs prior to discharge.   (Progressing)       Start:  05/27/24    Expected End:  06/10/24                         Additional Comments:  DUQUE collaborated with patients nurse and charge nurse throughout the day to provide appropriate support and encouragement to attend groups. Pt up on unit when DUQUE left last group of the day. All needs met.

## 2024-05-31 PROCEDURE — 99232 SBSQ HOSP IP/OBS MODERATE 35: CPT | Performed by: PSYCHIATRY & NEUROLOGY

## 2024-05-31 PROCEDURE — 97150 GROUP THERAPEUTIC PROCEDURES: CPT | Mod: GO,CO

## 2024-05-31 PROCEDURE — 2500000001 HC RX 250 WO HCPCS SELF ADMINISTERED DRUGS (ALT 637 FOR MEDICARE OP): Performed by: NURSE PRACTITIONER

## 2024-05-31 PROCEDURE — 2500000001 HC RX 250 WO HCPCS SELF ADMINISTERED DRUGS (ALT 637 FOR MEDICARE OP): Performed by: PSYCHIATRY & NEUROLOGY

## 2024-05-31 PROCEDURE — 1240000001 HC SEMI-PRIVATE BH ROOM DAILY

## 2024-05-31 PROCEDURE — 2500000002 HC RX 250 W HCPCS SELF ADMINISTERED DRUGS (ALT 637 FOR MEDICARE OP, ALT 636 FOR OP/ED): Performed by: PSYCHIATRY & NEUROLOGY

## 2024-05-31 RX ORDER — MIRTAZAPINE 15 MG/1
15 TABLET, ORALLY DISINTEGRATING ORAL NIGHTLY
Status: DISCONTINUED | OUTPATIENT
Start: 2024-05-31 | End: 2024-06-04

## 2024-05-31 RX ORDER — ARIPIPRAZOLE 5 MG/1
5 TABLET ORAL DAILY
Status: DISCONTINUED | OUTPATIENT
Start: 2024-06-01 | End: 2024-06-01

## 2024-05-31 RX ORDER — DULOXETIN HYDROCHLORIDE 30 MG/1
30 CAPSULE, DELAYED RELEASE ORAL DAILY
Status: DISCONTINUED | OUTPATIENT
Start: 2024-06-01 | End: 2024-06-01

## 2024-05-31 RX ORDER — ARIPIPRAZOLE 2 MG/1
2 TABLET ORAL ONCE
Status: COMPLETED | OUTPATIENT
Start: 2024-05-31 | End: 2024-05-31

## 2024-05-31 RX ADMIN — FLUVOXAMINE MALEATE 50 MG: 50 TABLET ORAL at 10:03

## 2024-05-31 RX ADMIN — MIRTAZAPINE 15 MG: 15 TABLET, ORALLY DISINTEGRATING ORAL at 20:22

## 2024-05-31 RX ADMIN — NORETHINDRONE ACETATE AND ETHINYL ESTRADIOL 1 TABLET: KIT at 10:03

## 2024-05-31 RX ADMIN — HYDROXYZINE PAMOATE 50 MG: 50 CAPSULE ORAL at 20:23

## 2024-05-31 RX ADMIN — ARIPIPRAZOLE 2 MG: 2 TABLET ORAL at 15:37

## 2024-05-31 RX ADMIN — BACITRACIN 1 APPLICATION: 500 OINTMENT TOPICAL at 20:22

## 2024-05-31 RX ADMIN — HYDROXYZINE PAMOATE 50 MG: 50 CAPSULE ORAL at 10:23

## 2024-05-31 ASSESSMENT — COLUMBIA-SUICIDE SEVERITY RATING SCALE - C-SSRS
2. HAVE YOU ACTUALLY HAD ANY THOUGHTS OF KILLING YOURSELF?: NO
1. SINCE LAST CONTACT, HAVE YOU WISHED YOU WERE DEAD OR WISHED YOU COULD GO TO SLEEP AND NOT WAKE UP?: NO
6. HAVE YOU EVER DONE ANYTHING, STARTED TO DO ANYTHING, OR PREPARED TO DO ANYTHING TO END YOUR LIFE?: NO

## 2024-05-31 ASSESSMENT — PAIN - FUNCTIONAL ASSESSMENT
PAIN_FUNCTIONAL_ASSESSMENT: 0-10
PAIN_FUNCTIONAL_ASSESSMENT: 0-10

## 2024-05-31 ASSESSMENT — PAIN SCALES - GENERAL
PAINLEVEL_OUTOF10: 0 - NO PAIN
PAINLEVEL_OUTOF10: 0 - NO PAIN

## 2024-05-31 NOTE — GROUP NOTE
Group Topic: Goals   Group Date: 5/31/2024  Start Time: 0730  End Time: 0800  Facilitators: MINA Elena   Department: [facility] REHAB THERAPY VIRTUAL    Number of Participants: 4   Group Focus: Recovery, check in, daily focus, and goals  Treatment Modality: Recreation Therapy  Interventions utilized were: Areas of Recovery (Rules? What Rules? Handout), exploration, orientation, problem solving, and support  Purpose: Goal Identification, insight or knowledge and self-care    Name: Ruthy Mcrae YOB: 1995   MR: [id number]      Facilitator: Recreational Therapist  Level of Participation: did not attend  Progress: None  Comments: A handout was provided and discussed that included six recovery concepts (be safe, be here, be honest, commit to goals, care for self and others, let go and move on).  Participants had the opportunity to share their thoughts about each of the areas and participate in a discussion on how to work on each. Patients were also asked to create a goal related to one or two of the categories.      Patient remained in their room throughout the session resting/sleeping.     Plan: continue with services

## 2024-05-31 NOTE — CARE PLAN
"The patient's goals for the shift include \"get sleep\"    The clinical goals for the shift include medicaion compliance    Over the shift, the patient did not make progress toward the following goals. Barriers to progression include lack of medication knowledge. Recommendations to address these barriers include more education on medications.    "

## 2024-05-31 NOTE — PROGRESS NOTES
"Ruthy Mcrae is a 28 y.o. female on day 6 of admission presenting with Major depressive disorder, recurrent (CMS-HCC).      Subjective   The patient was seen and examined. I reviewed the chart and vital signs from overnight. I reviewed previous notes. I reviewed medications, administered overnight and their reported benefits or side effects. I met with the patient and her father this afternoon. Patient reported that she can't believe anything she is writing. She feels it is all false., her minds keeps telling her. She feels she can't function. Father reported the mother was treated and in 10 year remission on Cymbalta and Abilify.   Objective     Last Recorded Vitals  Blood pressure 109/74, pulse 78, temperature 36.3 °C (97.3 °F), temperature source Temporal, resp. rate 16, height 1.778 m (5' 10\"), weight 73.9 kg (162 lb 14.7 oz), SpO2 97%.    Review of Systems    Physical Exam  Mental Status Exam:   General: CF with ocd, anxiety, depression   Appearance: Appears stated age, in own attire fair grooming and hygiene  Attitude: cooperative, calm, engaged in her care   Behavior: tearful, \"stuck\"  Motor Activity: No eps or td. Normal gait/station. normal muscle tone/bulk.  Speech: Normal rate, volume, tone, spontaneous, fluent.  Mood: depression 9 and anxiety 10  Affect: reactive, anxious  Thought Process: Organized, linear, goal directed.  Thought Content: denied auditory and visual hallucinations, elicted ocd level thinking boardering on fixed delusions, ruminative, states she can't function like this, has SI when she can't function.   Thought Perception: denies hallucinations. does not appear to be responding to hallucinatory stimuli  Cognition: Alert, oriented x3. Adequate fund of knowledge. Grossly intact.   Insight: fair; as patient recognizes symptoms of illness and need for recommended treatments.  Judgment: fair; actively seeking help.     IMPRESSION:  SI  COLLIN  Major Depressive disorder, severe with paranoid " delusions  OCD     Plan:    Monitor sleep, anxiety  Luvox 25mg bid  Zyprexa 5mg at bedtime  Milieu therapy    5/30: luvox decreases enzymatic breakdown of Zyprexa increasing latter drug levels. Reduce Zyprexa to 2.5mg at bedtime and increase luvox to 50mg am and 25mg at bedtime today,  with goal to stop Zyprexa prior to discharge if possible   5/31: stop luvox, start Cymbalta 30mg in am, stop Zyprexa start Abilify 2mg not and 5mg in am  Start Remeron 15mg at bedtime for sleep    I spent 25 minutes in the professional and overall care of this patient.      Brittani Bernabe MD

## 2024-05-31 NOTE — CARE PLAN
"Discussed in Treatment team today;  Patient is still unstable; Spoke with both biological parents over the phone this morning: they stated that patient phoned last night talking about \"ending it\" because she does not see progress in her treatment so far in the hospital;  Father stated patient was very upset over the phone, very anxious; stating patient is very fearful, struggling, afraid nothing is going to change; feels unattractive, insecure, petrified of losing her boyfriend, is obsessed that she is unattractive;  he further stated that she can present as composed but he thinks she needs to find her own self worth, agrees it needs to come from inside her, not externally based.  He agrees her self concept is entirely external.  Dad stated patient called last night and said: \"I just don't think I can deal with the pain (referring to the potential break up with her boyfriend);  (Both parents sound supportive, insightful, caring, and involved with patient's care and wellbeing); Father relays that boyfriend's ex came into town over New Year's and he had an affair and patient has told parents she thinks it is all her fault.  Has told the parents she cannot go through life, feels unattractive; he states patient takes seemingly innocent comments the wrong way.  Mom stated phone call from patient was \"scary\", with patient telling her, \"I'm never going to get better\". Mom worried pt will further spiral if she goes back to [place] and boyfriend.   Educated parents on CBT and recommended [facility] as possible step down care, via PHP/IOP, there to help her further cope and help her transition from hospital to home, get further cognitive behavioral therapy;  Parents agreed;  Told parents goal is to stabilize and transition to outpatient mental health care for ongoing therapy and medication management; Sw to follow.   "

## 2024-05-31 NOTE — GROUP NOTE
"Group Topic: Leisure Skills   Group Date: 5/31/2024  Start Time: 1600  End Time: 1650  Facilitators: DIANDRA ElenaS   Department: [facility] REHAB THERAPY VIRTUAL    Number of Participants: 5   Group Focus: coordinating movement, concentration, leisure skills, and problem solving  Treatment Modality: Recreation Therapy   Interventions utilized were: Pling Pong (with Pop Culture Trivia), leisure development and problem solving  Purpose: Cognitive Stimulation, Movement, Social Stimulation, Pleasurable Activity, communication skills    Name: Ruthy Mcrae YOB: 1995   MR: [id number]      Facilitator: Recreational Therapist  Level of Participation: active  Quality of Participation: appropriate/pleasant, attentive, cooperative, and engaged  Interactions with others:  kind and appropriate  Mood/Affect: appropriate and brightens with interaction  Triggers (if applicable): N/A  Cognition: coherent/clear and logical  Progress: Moderate  Comments: This session involved a leisure activity called \"Pling Pong\" (with trivia questions).  The activity involved cognitive tasks, social interactions, healthy competition, leisure awareness, and a pleasurable experience. All participants were encouraged to listen to the rules, ask questions as needed, and participate in the activity to the best of their abilities.    Patient displayed a much improved mood/affect during this session. She was able to complete all desired physical and cognitive tasks independently. Ms. Mcrae appeared to enjoy the activity and the company of her peers.     Plan: continue with services      "

## 2024-05-31 NOTE — PROGRESS NOTES
"Occupational Therapy     REHAB Therapy Assessment & Treatment    Patient Name: Ruthy Mcrae  MRN: [id number]  Today's Date: 5/31/2024      Activity Assessment:     Mindfulness/Gratitude and Yoga Group: [id number]  Self Confidence and Humor as a Coping Tool Group: [id number]  Open Rec and Creative Art Group: [id number]    2/3 Groups attended      Pt present in all groups this date excluding open rec as pt remains in her room during open rec for unknown reasons. During attended groups, pt with continued G attention to task, insightful comments, and G social interaction with peers during group discussions. Pt with G understanding of mindfulness education as well as shares \"feeling better\" following chair yoga task. During self confidence activity, pt continues to demo improved confidence when sharing aloud, G understanding of how humor could be used as a coping tool and able to ID X3 personal strengths without hesitation sharing \"kind, compassionate, fun\" Pt continues to demo G progress toward OT Goals as evidenced above. Pt would benefit from continued OT services in order to improve overall self-esteem, personal confidence and positive supports for safe transition at discharge.      Encounter Problems       Encounter Problems (Active)       OT Goals       Pt will ID 2 community resources/programs to join/attend after D/C to improve their support system.  (Progressing)       Start:  05/27/24    Expected End:  06/10/24            Pt will demo/ID 2-3 ways to improve effectiveness in completing tasks and responsibilities, while focusing attention on the present.   (Progressing)       Start:  05/27/24    Expected End:  06/10/24            Pt will explore and ID 1-2 strategies to manage stressors/symptoms of illness/ grief more effectively prior to discharge.   (Progressing)       Start:  05/27/24    Expected End:  06/10/24            Pt will explore and ID 1-2 effective methods of expressing feelings, want and needs prior " to discharge.   (Progressing)       Start:  05/27/24    Expected End:  06/10/24                     Additional Comments:    DUQUE collaborated with patients nurse and charge nurse throughout the day to provide appropriate support and encouragement to attend groups. Pt up on unit when DUQUE left last group of the day. All needs met.

## 2024-05-31 NOTE — NURSING NOTE
"Pt interview in the front unge   Pt is reading a book and eating her snack  Pt stated her day was \"not great\"  Pt rated her anxiety 8/10  depression 7/10  and denied everything else at this time  Pt stated her goal \"get sleep\"  her strength \"I sing\"  and her coping skill \"I journal\"  Pt is appropriate with her answers to the questions at this time  "

## 2024-05-31 NOTE — NURSING NOTE
Pt slept all night long  Pt took her night time medications and slept all night long  Pt had an uneventful night

## 2024-05-31 NOTE — CARE PLAN
"Patient out on unit for meals and groups. Rated anxiety 5/10 and depression 6/10 due to insecurities about appearance and low self-esteem. Denied SI/HI. Denied auditory/visual/other hallucinations. No complaints of pain or discomfort. Medication compliant. Able to state positive coping skills such as \"journaling\". The patient's goals for the shift include \"keep trying the CBT things\". Q15 minute checks to be maintained throughout shift for safety.     "

## 2024-05-31 NOTE — GROUP NOTE
"Group Topic: Personal Responsibility   Group Date: 5/31/2024  Start Time: 1400  End Time: 1450  Facilitators: MINA Elena   Department: [facility] REHAB THERAPY VIRTUAL    Number of Participants: 5   Group Focus: concentration, personal responsibility, and self-awareness  Treatment Modality: Recreation Therapy  Interventions utilized were: Living Skills (Values and Responsibilities), Handout and DVD, assignment, exploration, and support  Purpose: coping skills, insight or knowledge, self-worth, and self-care    Name: Ruthy Mcrae YOB: 1995   MR: [id number]      Facilitator: Recreational Therapist  Level of Participation: moderate  Quality of Participation: attentive, cooperative, and quiet  Interactions with others:  minimal  Mood/Affect: closed / guarded  Triggers (if applicable): N/A  Cognition:  capable  Progress: Moderate  Comments: Group focused on concepts related to values and responsibilities.  We discussed role models, a values list, characteristics of inspirational individuals, and how to identify and implement beliefs. Patients were asked to identify some personal responsibilities including self, family/friends/support and daily life. Participants were encouraged to share thoughts related to the topics and also develop some action steps to achieve them.    Patient attended the entire session. She was slightly more participative that previous encounters. Ms. Mcrae identified values important to her including \"open-mindedness, fun, and caring for others\". She was less interactive when discussing the personal responsibilities. Patient appeared to understand the information being discussed and presented.     Plan: continue with services      "

## 2024-06-01 PROCEDURE — 2500000002 HC RX 250 W HCPCS SELF ADMINISTERED DRUGS (ALT 637 FOR MEDICARE OP, ALT 636 FOR OP/ED): Performed by: PSYCHIATRY & NEUROLOGY

## 2024-06-01 PROCEDURE — 2500000001 HC RX 250 WO HCPCS SELF ADMINISTERED DRUGS (ALT 637 FOR MEDICARE OP): Performed by: NURSE PRACTITIONER

## 2024-06-01 PROCEDURE — 1240000001 HC SEMI-PRIVATE BH ROOM DAILY

## 2024-06-01 PROCEDURE — 99233 SBSQ HOSP IP/OBS HIGH 50: CPT | Performed by: PSYCHIATRY & NEUROLOGY

## 2024-06-01 PROCEDURE — 2500000001 HC RX 250 WO HCPCS SELF ADMINISTERED DRUGS (ALT 637 FOR MEDICARE OP): Performed by: PSYCHIATRY & NEUROLOGY

## 2024-06-01 RX ORDER — ARIPIPRAZOLE 5 MG/1
7.5 TABLET ORAL DAILY
Status: DISCONTINUED | OUTPATIENT
Start: 2024-06-03 | End: 2024-06-04

## 2024-06-01 RX ORDER — DULOXETIN HYDROCHLORIDE 60 MG/1
60 CAPSULE, DELAYED RELEASE ORAL DAILY
Status: DISCONTINUED | OUTPATIENT
Start: 2024-06-02 | End: 2024-06-10 | Stop reason: HOSPADM

## 2024-06-01 RX ORDER — ARIPIPRAZOLE 5 MG/1
5 TABLET ORAL DAILY
Status: COMPLETED | OUTPATIENT
Start: 2024-06-02 | End: 2024-06-02

## 2024-06-01 RX ADMIN — NORETHINDRONE ACETATE AND ETHINYL ESTRADIOL 1 TABLET: KIT at 08:21

## 2024-06-01 RX ADMIN — MIRTAZAPINE 15 MG: 15 TABLET, ORALLY DISINTEGRATING ORAL at 20:17

## 2024-06-01 RX ADMIN — BACITRACIN 1 APPLICATION: 500 OINTMENT TOPICAL at 08:21

## 2024-06-01 RX ADMIN — DULOXETINE HYDROCHLORIDE 30 MG: 30 CAPSULE, DELAYED RELEASE ORAL at 08:21

## 2024-06-01 RX ADMIN — ARIPIPRAZOLE 5 MG: 5 TABLET ORAL at 08:21

## 2024-06-01 RX ADMIN — BACITRACIN 1 APPLICATION: 500 OINTMENT TOPICAL at 20:17

## 2024-06-01 RX ADMIN — HYDROXYZINE PAMOATE 50 MG: 50 CAPSULE ORAL at 08:26

## 2024-06-01 ASSESSMENT — PAIN SCALES - GENERAL
PAINLEVEL_OUTOF10: 0 - NO PAIN
PAINLEVEL_OUTOF10: 0 - NO PAIN

## 2024-06-01 ASSESSMENT — PAIN - FUNCTIONAL ASSESSMENT
PAIN_FUNCTIONAL_ASSESSMENT: 0-10
PAIN_FUNCTIONAL_ASSESSMENT: 0-10

## 2024-06-01 NOTE — GROUP NOTE
Group Topic: Self-Care/Wellness   Group Date: 6/1/2024  Start Time: 0930  End Time: 1020  Facilitators: MINA Trujillo   Department: [facility] REHAB THERAPY VIRTUAL    Number of Participants: 5   Group Focus: coping skills, healthy friendships, leisure skills, and self-awareness  Treatment Modality: Recreation Therapy  Interventions utilized were assignment and exploration  Purpose: coping skills, insight or knowledge, and self-care    Name: Ruthy Mcare YOB: 1995   MR: [id number]      Facilitator: Recreational Therapist  Level of Participation: did not attend  Progress: None  Comments: Patients completed a self care assessment and evaluated their self care practices across five areas: physical, psychological/emotional, social, spiritual, and professional. After ranking themselves in each category, we discussed strategies for improvement in the areas needing attention. Patients were then provided with an additional handout on how to improve self-care. Patient declined invitation to group activity at this time. Patient will continue to be provided with opportunities to enhance leisure skills and/or coping mechanisms.  Plan: continue with services

## 2024-06-01 NOTE — NURSING NOTE
"Patient out on the unit and social with peers this shift. Rated anxiety 6/10 and depression 6/10. Denied SI/HI. Denied auditory/visual/other hallucinations. No complaints of pain or discomfort. Patient has attended group therapy this shift. Medication compliant. Able to state positive coping skills such as \"journaling and CBT\". Patient has been calm and cooperative with staff. Q 15 minute checks to be maintained throughout shift for safety.    "

## 2024-06-01 NOTE — NURSING NOTE
Patient is out on the unit talking on the phone. Pt ate her snack while sitting with other patients in the lounge. Pt appears brighter this evening and is smiling and making appropriate eye contact. Pt rated anxiety 4, depression 5, denied si/hi, denied a/v hallucinations. Pt is medication compliant.

## 2024-06-01 NOTE — GROUP NOTE
Group Topic: Art Creative   Group Date: 6/1/2024  Start Time: 1400  End Time: 1545  Facilitators: DIANDRA TrujilloS   Department: [facility] REHAB THERAPY VIRTUAL    Number of Participants: 7   Group Focus: art therapy and relaxation  Treatment Modality: Recreation Therapy  Interventions utilized were exploration and leisure development  Purpose: coping skills    Name: Ruthy Mcrae YOB: 1995   MR: [id number]      Facilitator: Recreational Therapist  Level of Participation: active  Quality of Participation: appropriate/pleasant and cooperative  Interactions with others: appropriate  Mood/Affect: appropriate and brightens with interaction  Triggers (if applicable): n/a  Cognition: coherent/clear  Progress: Moderate  Comments: Patients participated in art tasks as an avenue of self-expression and emotional release. Through the creative process, patients explored and communicated complex feelings, fostering a sense of control, empowerment, and self-discovery. Engaging in the task provided a cathartic outlet, promoting relaxation, stress reduction, and the cultivation of coping skills to enhance emotional well-being and encouraging social connection. Pt arrived late to group, participated in art task, social with peers.   Plan: continue with services

## 2024-06-01 NOTE — CARE PLAN
"The patient's goals for the shift include \"To keep learning how to feel better about myelf\"    The clinical goals for the shift include medication compliance    Over the shift, the patient did make progress toward the following goals    Problem: Risk for Suicide  Goal: Accepts medications as prescribed/needed this shift  Outcome: Progressing     Problem: Risk for Suicide  Goal: Makes needs known through verbalization or behaviors this shift  Outcome: Progressing     Problem: Risk for Suicide  Goal: No self harm this shift  Outcome: Progressing     Problem: Fall/Injury  Goal: Not fall by end of shift  Outcome: Progressing     Problem: Fall/Injury  Goal: Be free from injury by end of the shift  Outcome: Progressing     Problem: Ineffective Coping  Goal: Identifies healthy coping skills  Outcome: Progressing     Problem: Ineffective Coping  Goal: Demonstrates healthy coping skills  Outcome: Progressing     Problem: Ineffective Coping  Goal: Participates in unit activities  Outcome: Progressing     Problem: Alteration in Sleep  Goal: STG - Identifies sleep hygiene aids  Outcome: Progressing     Problem: Alteration in Sleep  Goal: STG - Informs staff if unable to sleep  Outcome: Progressing     Problem: Anxiety  Goal: Attempts to manage anxiety with help  Outcome: Progressing     Problem: Anxiety  Goal: Verbalizes ways to manage anxiety  Outcome: Progressing     "

## 2024-06-01 NOTE — GROUP NOTE
Group Topic: Excercise/Physical    Group Date: 6/1/2024  Start Time: 1100  End Time: 1145  Facilitators: DIANDRA TrujilloS   Department: [facility] REHAB THERAPY VIRTUAL    Number of Participants: 4   Group Focus: leisure skills, self-esteem, and social skills  Treatment Modality: Recreation Therapy  Interventions utilized were group exercise  Purpose: exercise and movement    Name: Ruthy Mcrae YOB: 1995   MR: [id number]      Facilitator: Recreational Therapist  Level of Participation: did not attend  Progress: None  Comments: Patients engaged in the skilled session of Tobi. This recreational pursuit honed participants' hand-eye coordination and fine motor skills along with cultivating a convivial atmosphere, promoting social interaction and fostering a sense of achievement. The strategic nuances of the game also elevated both cognitive engagement and camaraderie contributing to the multifaceted well-being of those involved in this engaging therapeutic activity. Patient declined invitation to group activity at this time. Patient will continue to be provided with opportunities to enhance leisure skills and/or coping mechanisms.  Plan: continue with services

## 2024-06-01 NOTE — PROGRESS NOTES
"Ruthy Mcrae is a 28 y.o. year old female patient who is on [facility] admission day 7.      Subjective   Ruthy Mcrae is a 28 y.o. year old female patient who was personally seen and interviewed, and discussed in morning team rounds. The patient was interviewed alone at the end of the hallway (interviewed sitting in a chair), and was easily engaged and cooperative. This morning, Ruthy reports feeling \"kind of anxious\" and currently rates her depression at a 7 out of 10. No suicidal ideation or plans were elicited. She also rates her anxiety at a 7 out of 10. Ruthy states \"I ruminate all the time, I just can't get my mind to shift off these thoughts.\" No hallucinations or paranoia were endorsed or noted.   Ruthy slept 8.25 hours last night (unbroken).           Objective   Mental Status Exam:   General: Appropriately groomed and dressed in casual/hospital attire.   Appearance: Appears stated age.   Attitude: Calm, cooperative.   Behavior: Appropriate eye contact.   Motor Activity: No agitation or retardation. No EPS/TD. Normal gait and station. Normal muscle tone and bulk.   Speech: Regular rate, rhythm, volume and tone, spontaneous, fluent. Non-pressured.   Mood: \"Kind of anxious\"   Affect: Neutral.   Thought Process: Organized, and goal directed.   Thought Content: Does not endorse suicidal ideation or any suicide plans.   Does not endorse homicidal ideation.  No current overt delusions or paranoia elicited.    Thought Perception: Does not endorse auditory or visual hallucinations, does not appear to be responding to hallucinatory stimuli.   Cognition: Alert, oriented x 3. No deficits noted. Adequate fund of knowledge. No deficit in recent and remote memory. No deficits in attention, concentration or language.   Insight: Fair, as patient recognizes symptoms of illness and need for recommended treatments.    Judgment: Poor-to-Fair, as patient can make reasonable decisions about ordinary activities of " daily living and necessary medical care recommendations.       LABS:  No results found for this or any previous visit (from the past 96 hour(s)).     Last Recorded Vitals  Visit Vitals  BP 97/68 (BP Location: Right arm, Patient Position: Sitting)   Pulse 91   Temp 36.6 °C (97.9 °F) (Temporal)   Resp 16        Intake/Output last 3 Shifts:  No intake/output data recorded.    Relevant Results  Scheduled medications  ARIPiprazole, 5 mg, oral, Daily  bacitracin, 1 Application, Topical, TID  DULoxetine, 30 mg, oral, Daily  mirtazapine, 15 mg, oral, Nightly  norethindrone-e.estradioL-iron, 1 tablet, oral, Daily      Continuous medications     PRN medications  PRN medications: acetaminophen, docusate sodium, hydrOXYzine pamoate, hydrOXYzine pamoate               Assessment/Plan   1) Major Depressive Disorder, recurrent, severe with psychotic features (paranoid delusions)       Plan: 1) trial Duloxetine 30 mg QAM                2) trial Abilify 5 mg QAM                3) trial Mirtazapine 15 mg at bedtime                4) Group and milieu therapy    2) Generalized Anxiety Disorder       Plan: 1) see above    3) OCD       Plan: 1) see above              Quinton Andrade MD

## 2024-06-01 NOTE — CARE PLAN
"The patient's goals for the shift include \"keep my depression and anxiety down\"    The clinical goals for the shift include maintain patient safety    Over the shift, the patient did not make progress toward the following goals. Barriers to progression include acuteness of illness. Recommendations to address these barriers include maintain Q 15 minute rounds for patient safety.    "

## 2024-06-02 PROCEDURE — 99233 SBSQ HOSP IP/OBS HIGH 50: CPT | Performed by: PSYCHIATRY & NEUROLOGY

## 2024-06-02 PROCEDURE — 2500000002 HC RX 250 W HCPCS SELF ADMINISTERED DRUGS (ALT 637 FOR MEDICARE OP, ALT 636 FOR OP/ED): Performed by: PSYCHIATRY & NEUROLOGY

## 2024-06-02 PROCEDURE — 2500000001 HC RX 250 WO HCPCS SELF ADMINISTERED DRUGS (ALT 637 FOR MEDICARE OP): Performed by: NURSE PRACTITIONER

## 2024-06-02 PROCEDURE — 2500000001 HC RX 250 WO HCPCS SELF ADMINISTERED DRUGS (ALT 637 FOR MEDICARE OP): Performed by: PSYCHIATRY & NEUROLOGY

## 2024-06-02 PROCEDURE — 1240000001 HC SEMI-PRIVATE BH ROOM DAILY

## 2024-06-02 RX ADMIN — MIRTAZAPINE 15 MG: 15 TABLET, ORALLY DISINTEGRATING ORAL at 20:41

## 2024-06-02 RX ADMIN — ARIPIPRAZOLE 5 MG: 5 TABLET ORAL at 08:35

## 2024-06-02 RX ADMIN — NORETHINDRONE ACETATE AND ETHINYL ESTRADIOL 1 TABLET: KIT at 08:35

## 2024-06-02 RX ADMIN — HYDROXYZINE PAMOATE 50 MG: 50 CAPSULE ORAL at 08:35

## 2024-06-02 RX ADMIN — BACITRACIN 1 APPLICATION: 500 OINTMENT TOPICAL at 20:41

## 2024-06-02 RX ADMIN — DULOXETINE HYDROCHLORIDE 60 MG: 60 CAPSULE, DELAYED RELEASE ORAL at 08:35

## 2024-06-02 RX ADMIN — HYDROXYZINE PAMOATE 50 MG: 50 CAPSULE ORAL at 20:41

## 2024-06-02 ASSESSMENT — PAIN - FUNCTIONAL ASSESSMENT
PAIN_FUNCTIONAL_ASSESSMENT: 0-10
PAIN_FUNCTIONAL_ASSESSMENT: 0-10

## 2024-06-02 ASSESSMENT — PAIN SCALES - GENERAL
PAINLEVEL_OUTOF10: 0 - NO PAIN
PAINLEVEL_OUTOF10: 0 - NO PAIN

## 2024-06-02 NOTE — NURSING NOTE
Pt slept all night long  Pt had an uneventful night  Pt took her night time medications and went to bed

## 2024-06-02 NOTE — GROUP NOTE
"Group Topic: Spiritual/Devotional/Thought of the Day   Group Date: 6/2/2024  Start Time: 0930  End Time: 1020  Facilitators: MINA Negro   Department: [facility] REHAB THERAPY VIRTUAL    Number of Participants: 8   Group Focus: goals, personal responsibility, and social skills  Treatment Modality: Other: Recreational Therapy   Interventions utilized were exploration, patient education, and support  Purpose: insight or knowledge, self-worth, and self-care    Name: Ruthy Mcrae YOB: 1995   MR: [id number]      Facilitator: Recreational Therapist  Level of Participation: active  Quality of Participation: appropriate/pleasant, cooperative, and engaged  Interactions with others: appropriate  Mood/Affect: appropriate and positive  Triggers (if applicable): n/a  Cognition: coherent/clear  Progress: Moderate  Comments: Patients were provided with the Thought of the Day reading - “Can I stay with the moment and activity before me, just for today?” Patients were given an opportunity to share their thoughts and encouraged to create a personal goal based on the reading.      Pt was pleasant and participative in group ice breaker and shared appropriately during discussion. She identified that she would like to work on \"building self-confidence\" and \"ruminating thoughts\".     Plan: continue with services      "

## 2024-06-02 NOTE — GROUP NOTE
"Group Topic: Leisure Skills   Group Date: 6/2/2024  Start Time: 1110  End Time: 1200  Facilitators: DIANDRA NegroS   Department: [facility] REHAB THERAPY VIRTUAL    Number of Participants: 8   Group Focus: concentration, leisure skills, and social skills  Treatment Modality: Other: Recreational Therapy   Interventions utilized were exploration and leisure development  Purpose: other: leisure awareness, cognitive skills/focus, social engagement, healthy competition     Name: Ruthy Mcrae YOB: 1995   MR: [id number]      Facilitator: Recreational Therapist  Level of Participation: active  Quality of Participation: appropriate/pleasant, cooperative, and engaged  Interactions with others: appropriate  Mood/Affect: appropriate and positive  Triggers (if applicable): n/a  Cognition: coherent/clear  Progress: Moderate  Comments: Patients were gathered to learn and participate in the game \"That's It\". This activity works on cognitive skills, following directions, positive social interaction/teamwork, and promotes leisure awareness.    Pt was pleasant, social, and fully engaged in group task.     Plan: continue with services      "

## 2024-06-02 NOTE — NURSING NOTE
"Pt interview in the front unge  Pt is watching TV and eating her snack  Pt stated her day was \"decent\"  Pt rated her anxiety 4/10  depression 4/10  and denied everything else   Pt stated her goal \" to finish my book and get some sleep\"  her strength \"I bake things\"  and her coping skill \"I journal\"  Pt is appropriate with her answers to the questions at this time    "

## 2024-06-02 NOTE — CARE PLAN
"The patient's goals for the shift include \"read my book and get some sleep\"    The clinical goals for the shift include medication compliance    Over the shift, the patient did not make progress toward the following goals. Barriers to progression include lack of medication knowledge. Recommendations to address these barriers include more education on medications.    "

## 2024-06-02 NOTE — NURSING NOTE
"Patient out on the unit and social with peers this shift. Rated anxiety 6/10 and depression 5/10. Denied SI/HI. Denied auditory/visual/other hallucinations. No complaints of pain or discomfort. Patient has attended group therapy. Medication compliant. PRN anxiety medication Vistaril 50 mg given at 0835. Able to state positive coping skills such as \"write in my journal and reading books\". Patient has been pleasant and cooperative with staff.  Q 15 minute checks to be maintained throughout shift for safety.    "

## 2024-06-02 NOTE — GROUP NOTE
Group Topic: Art Creative   Group Date: 6/2/2024  Start Time: 1400  End Time: 1545  Facilitators: DIANDRA NegroS   Department: [facility] REHAB THERAPY VIRTUAL    Number of Participants: 8   Group Focus: concentration, leisure skills, and social skills  Treatment Modality: Other: Recreational Therapy  Interventions utilized were exploration and leisure development  Purpose: other: creative expression, leisure awareness, social engagement     Name: Ruthy Mcrae YOB: 1995   MR: [id number]      Facilitator: Recreational Therapist  Level of Participation: active  Quality of Participation: appropriate/pleasant, cooperative, and engaged  Interactions with others: appropriate  Mood/Affect: appropriate and positive  Triggers (if applicable): n/a  Cognition: coherent/clear  Progress: Moderate  Comments: Patients were gathered to participate in painting/ceramics and also offered a variety of crafts from the [employer]. This activity works on creative expression, fine motor skills, following directions, positive social interaction, and leisure awareness.    Pt was pleasant, social, and engaged during session. She spent the first hour reading/listening to music, but then chose to work on a ceramic painting project for the second half.     Plan: continue with services

## 2024-06-02 NOTE — PROGRESS NOTES
"Ruthy Mcrae is a 28 y.o. year old female patient who is on [facility] admission day 8.      Subjective   Ruthy Mcrae is a 28 y.o. year old female patient who was personally seen and interviewed, and discussed in morning team rounds. The patient was interviewed alone at the end of the hallway (interviewed sitting in a chair), and was easily engaged and cooperative. This morning, Ruthy reports feeling \"anxious\" and currently rates her depression at a 6-7 out of 10. No suicidal ideation or plans were elicited. She also rates her anxiety at a 6-7 out of 10. No hallucinations or paranoia were endorsed or noted.   Ruthy slept 8.5 hours last night (unbroken).           Objective   Mental Status Exam:   General: Appropriately groomed and dressed in casual/hospital attire.   Appearance: Appears stated age.   Attitude: Calm, cooperative.   Behavior: Appropriate eye contact.   Motor Activity: No agitation or retardation. No EPS/TD. Normal gait and station. Normal muscle tone and bulk.   Speech: Regular rate, rhythm, volume and tone, spontaneous, fluent. Non-pressured.   Mood: \"Anxious\"   Affect: Neutral.   Thought Process: Organized, and goal directed.   Thought Content: Does not endorse suicidal ideation or any suicide plans.   Does not endorse homicidal ideation.  No current overt delusions or paranoia elicited.    Thought Perception: Does not endorse auditory or visual hallucinations, does not appear to be responding to hallucinatory stimuli.   Cognition: Alert, oriented x 3. No deficits noted. Adequate fund of knowledge. No deficit in recent and remote memory. No deficits in attention, concentration or language.   Insight: Fair, as patient recognizes symptoms of illness and need for recommended treatments.    Judgment: Poor-to-Fair, as patient can make reasonable decisions about ordinary activities of daily living and necessary medical care recommendations.       LABS:  No results found for this or any previous " visit (from the past 96 hour(s)).     Last Recorded Vitals  Visit Vitals  BP 96/67 (BP Location: Right arm, Patient Position: Sitting)   Pulse 77   Temp 36.6 °C (97.9 °F)   Resp 14        Intake/Output last 3 Shifts:  No intake/output data recorded.    Relevant Results  Scheduled medications  [START ON 6/3/2024] ARIPiprazole, 7.5 mg, oral, Daily  bacitracin, 1 Application, Topical, TID  DULoxetine, 60 mg, oral, Daily  mirtazapine, 15 mg, oral, Nightly  norethindrone-e.estradioL-iron, 1 tablet, oral, Daily      Continuous medications     PRN medications  PRN medications: acetaminophen, docusate sodium, hydrOXYzine pamoate, hydrOXYzine pamoate               Assessment/Plan   1) Major Depressive Disorder, recurrent, severe with psychotic features (paranoid delusions)       Plan: *1) trial Duloxetine 30 -> 60 mg QAM                2) trial Abilify 5 mg QAM                3) trial Mirtazapine 15 mg at bedtime                4) Group and milieu therapy    2) Generalized Anxiety Disorder       Plan: 1) see above    3) OCD       Plan: 1) see above              Quitnon Andrade MD

## 2024-06-02 NOTE — CARE PLAN
"The patient's goals for the shift include \"to keep working on CBT\"    The clinical goals for the shift include medication compliance    Over the shift, the patient did make progress toward the following goals. Barriers to progression include no barriers. Recommendations to address these barriers include educate patient on prescribed medications.    "

## 2024-06-03 PROCEDURE — 97150 GROUP THERAPEUTIC PROCEDURES: CPT | Mod: GO,CO

## 2024-06-03 PROCEDURE — 2500000001 HC RX 250 WO HCPCS SELF ADMINISTERED DRUGS (ALT 637 FOR MEDICARE OP): Performed by: PSYCHIATRY & NEUROLOGY

## 2024-06-03 PROCEDURE — 2500000001 HC RX 250 WO HCPCS SELF ADMINISTERED DRUGS (ALT 637 FOR MEDICARE OP): Performed by: NURSE PRACTITIONER

## 2024-06-03 PROCEDURE — 1240000001 HC SEMI-PRIVATE BH ROOM DAILY

## 2024-06-03 PROCEDURE — 2500000002 HC RX 250 W HCPCS SELF ADMINISTERED DRUGS (ALT 637 FOR MEDICARE OP, ALT 636 FOR OP/ED): Performed by: PSYCHIATRY & NEUROLOGY

## 2024-06-03 PROCEDURE — 99232 SBSQ HOSP IP/OBS MODERATE 35: CPT | Performed by: PSYCHIATRY & NEUROLOGY

## 2024-06-03 RX ADMIN — MIRTAZAPINE 15 MG: 15 TABLET, ORALLY DISINTEGRATING ORAL at 20:53

## 2024-06-03 RX ADMIN — DULOXETINE HYDROCHLORIDE 60 MG: 60 CAPSULE, DELAYED RELEASE ORAL at 08:52

## 2024-06-03 RX ADMIN — ARIPIPRAZOLE 7.5 MG: 5 TABLET ORAL at 08:52

## 2024-06-03 RX ADMIN — NORETHINDRONE ACETATE AND ETHINYL ESTRADIOL 1 TABLET: KIT at 08:51

## 2024-06-03 ASSESSMENT — PAIN - FUNCTIONAL ASSESSMENT
PAIN_FUNCTIONAL_ASSESSMENT: 0-10
PAIN_FUNCTIONAL_ASSESSMENT: 0-10

## 2024-06-03 ASSESSMENT — PAIN SCALES - GENERAL
PAINLEVEL_OUTOF10: 0 - NO PAIN
PAINLEVEL_OUTOF10: 0 - NO PAIN

## 2024-06-03 NOTE — NURSING NOTE
"Pt interview in the front roberunge  Pt is eating her snack and reading her book  Pt stated her day  was \"Decent\"  She rated her anxiety 4/10  depression 4/10  and denied everything else at this time  Pt stated her goal \"get sleep\" her strength \" I like to bake\"  and her coping skill \"I journal and read\"   pt is appropriate with her answers to the questions at this time   "

## 2024-06-03 NOTE — PROGRESS NOTES
"Occupational Therapy     REHAB Therapy Assessment & Treatment    Patient Name: Ruthy Mcrae  MRN: [id number]  Today's Date: 6/3/2024      Activity Assessment:   Quality of Life/ Daily Self Care Group: [id number]  Who are You and Personal Understanding Group: [id number]  Community Resources (SUZANNE) and Social Interaction Group:       3/3 Groups attended      Pt present in all groups with continued improved confidence when sharing, continued improving insight, and improved attention/focus on all tasks/discussion. Pt with G understanding of quality of life discussion as well as acknowledges \"needing to work on some things\" Pt with appropriate feedback/validation to some peers during group discussion as well as able to complete \"who are you handout\" to improved self understanding of how she can take back her quality of life sharing \"I want to be beautiful, I am inspired by music, Im happiest when Im laughing with my sister, I wish I could be more confident, and I have a bad habit of being self critical\" During CR group, pt continues to demo G attention to task, demo's G initiation to ask SUZANNE representative appropriate questions to improve insight re: other resources for discharge carry over, and overall continues to demo G progress toward OT goals as evidenced above. Pt would benefit from continued OT services in order to improve overall self-esteem, personal confidence and positive supports for safe transition at discharge.      Encounter Problems       Encounter Problems (Active)       OT Goals       Pt will ID 2 community resources/programs to join/attend after D/C to improve their support system.  (Progressing)       Start:  05/27/24    Expected End:  06/10/24            Pt will demo/ID 2-3 ways to improve effectiveness in completing tasks and responsibilities, while focusing attention on the present.   (Progressing)       Start:  05/27/24    Expected End:  06/10/24            Pt will explore and ID 1-2 " strategies to manage stressors/symptoms of illness/ grief more effectively prior to discharge.   (Progressing)       Start:  05/27/24    Expected End:  06/10/24            Pt will explore and ID 1-2 effective methods of expressing feelings, want and needs prior to discharge.   (Progressing)       Start:  05/27/24    Expected End:  06/10/24                     Additional Comments:    DUQUE collaborated with patients nurse and charge nurse throughout the day to provide appropriate support and encouragement to attend groups. Pt up on unit when DUQUE left last group of the day. All needs met.

## 2024-06-03 NOTE — GROUP NOTE
Group Topic: Leisure Skills   Group Date: 6/3/2024  Start Time: 1400  End Time: 1500  Facilitators: MINA Elena   Department: [facility] REHAB THERAPY VIRTUAL    Number of Participants: 8   Group Focus: concentration, coping skills, leisure skills, problem solving, and social skills  Treatment Modality: Recreation Therapy  Interventions utilized were: Skipbo, A Fun Leisure Personality Questionnaire, exploration, leisure development, mental fitness, problem solving, and support  Purpose: Leisure Awareness, Cognitive Stimulation, Social Stimulation, Pleasurable Activity, coping skills    Name: Ruthy Mcrae YOB: 1995   MR: [id number]      Facilitator: Recreational Therapist  Level of Participation: active  Quality of Participation: appropriate/pleasant, attentive, cooperative, and engaged  Interactions with others: appropriate and supportive  Mood/Affect: appropriate and positive  Triggers (if applicable): N/A  Cognition: coherent/clear and logical  Progress: Moderate  Comments: This session involved a leisure activity called Skipbo.  The activity involved cognitive tasks, social interactions, healthy competition, leisure awareness, and a pleasurable experience. All participants were encouraged to listen to the rules, ask questions as needed, and participate in the activity to the best of their abilities.  Participants were also provided a questionnaire to fill out on their own time which included leisure questions to aid with better understanding one's leisure personality. The handout included some example hobbies/interests that correlate with personality traits.     Patient completed all desired session tasks and was independent with her cognitive functioning. Ms. Mcrae is much improved and has increased her participation levels, engagement, and has displayed a much improved mood/affect.     Plan: continue with services

## 2024-06-03 NOTE — NURSING NOTE
Pt took her night time medications  Pt talked with her roommate and went to sleep Pt had an uneventful night

## 2024-06-03 NOTE — GROUP NOTE
Group Topic: Dialectical Behavioral Therapy - Emotional Regulation   Group Date: 6/3/2024  Start Time: 1600  End Time: 1705  Facilitators: MINA Elena   Department: [facility] REHAB THERAPY VIRTUAL    Number of Participants: 10   Group Focus: coping skills, feeling awareness/expression, and problem solving  Treatment Modality: Recreation Therapy  Interventions utilized were: Dialectical Behavioral Therapy (Emotion Regulation - Opposite Action, Anger, Sadness), exploration, mental fitness, problem solving, and support  Purpose: coping skills, maladaptive thinking, and feelings    Name: Ruthy Mcrae YOB: 1995   MR: [id number]      Facilitator: Recreational Therapist  Level of Participation: active  Quality of Participation: appropriate/pleasant, attentive, cooperative, and engaged  Interactions with others: appropriate and supportive  Mood/Affect: appropriate and positive  Triggers (if applicable): N/A  Cognition: coherent/clear and logical  Progress: Moderate  Comments: This emotion regulation coping skill set was focused on opposite action.  Most of the session focused on four major emotions and how to practice opposite action when emotional behavior is unjustified opposed to using problem solving techniques when emotions are justified. Participants were provided with skills to manage anger and sadness. The group discussed the differences between justified and unjustified emotions, feeling vs. turning into an emotion, and opposite action coping skills.    Patient was participative and asked questions when wanting clarification. She appeared to comprehend the information being discussed. Patient completed all desired session tasks.     Plan: continue with services

## 2024-06-03 NOTE — CARE PLAN
"  Problem: Risk for Suicide  Goal: Accepts medications as prescribed/needed this shift  Outcome: Progressing     Problem: Fall/Injury  Goal: Not fall by end of shift  Outcome: Progressing     Problem: Sensory Perceptual Alteration as Evidenced by  Goal: Participates in unit activities  Outcome: Progressing   The patient's goals for the shift include \"get sleep\"    The clinical goals for the shift include medication compliance    Over the shift, the patient did not make progress toward the following goals.     "

## 2024-06-03 NOTE — NURSING NOTE
"The pt was calm and cooperative during the interview that took place in the pt's room away from her peers for privacy. The pt rated her anxiety a 4/10, depression 5/10, denies SI, HI and AVH at this time as well as pain rating it a 0/10. Last bowel movement was, \"this morning\" 6/3/24. Coping skills, \"Journaling.\" Goal for the day, \"Attend groups and keep writing in my journal.\" Pt strength, \"Good at making people feel comfortable.\" The pt was medication compliant with her morning medications. Q 15 minute monitoring continued.   "

## 2024-06-03 NOTE — GROUP NOTE
"Group Topic: Goals   Group Date: 6/3/2024  Start Time: 0700  End Time: 0810  Facilitators: MINA Elena   Department: [facility] REHAB THERAPY VIRTUAL    Number of Participants: 8   Group Focus: check in, daily focus, and goals  Treatment Modality: Recreation Therapy  Interventions utilized were: A Goal Setting Process, orientation and support  Purpose: Goal Identification, insight or knowledge and self-care    Name: Ruthy Mcrae YOB: 1995   MR: [id number]      Facilitator: Recreational Therapist  Level of Participation: active  Quality of Participation: appropriate/pleasant, attentive, cooperative, engaged, and initiates communication  Interactions with others: appropriate  Mood/Affect: appropriate and positive  Triggers (if applicable): N/A  Cognition: coherent/clear and logical  Progress: Moderate  Comments: This session focused on the goal setting process. Goal areas included concepts like: finances, physical, pleasure, and attitude.  Patients were asked to think about a long term (5 year) and establish short term (1 year or less) goals in each area (listed).    Patient attended the entire session. Ms. Mcrae was engaged throughout and shared her thoughts frequently. Patient identified that she has been coping by \"journaling and completing CBT thought substitution practices\". Goals she shared included each of the categories. Patient completed all desired session tasks.     Plan: continue with services      "

## 2024-06-04 PROCEDURE — 99232 SBSQ HOSP IP/OBS MODERATE 35: CPT | Performed by: PSYCHIATRY & NEUROLOGY

## 2024-06-04 PROCEDURE — 2500000002 HC RX 250 W HCPCS SELF ADMINISTERED DRUGS (ALT 637 FOR MEDICARE OP, ALT 636 FOR OP/ED): Performed by: PSYCHIATRY & NEUROLOGY

## 2024-06-04 PROCEDURE — 2500000001 HC RX 250 WO HCPCS SELF ADMINISTERED DRUGS (ALT 637 FOR MEDICARE OP): Performed by: PSYCHIATRY & NEUROLOGY

## 2024-06-04 PROCEDURE — 2500000001 HC RX 250 WO HCPCS SELF ADMINISTERED DRUGS (ALT 637 FOR MEDICARE OP): Performed by: NURSE PRACTITIONER

## 2024-06-04 PROCEDURE — 1240000001 HC SEMI-PRIVATE BH ROOM DAILY

## 2024-06-04 PROCEDURE — 97150 GROUP THERAPEUTIC PROCEDURES: CPT | Mod: GO,CO

## 2024-06-04 RX ORDER — ARIPIPRAZOLE 10 MG/1
10 TABLET ORAL EVERY 24 HOURS
Status: DISCONTINUED | OUTPATIENT
Start: 2024-06-05 | End: 2024-06-05

## 2024-06-04 RX ORDER — TRAZODONE HYDROCHLORIDE 100 MG/1
100 TABLET ORAL NIGHTLY
Status: DISCONTINUED | OUTPATIENT
Start: 2024-06-04 | End: 2024-06-05

## 2024-06-04 RX ORDER — BENZTROPINE MESYLATE 0.5 MG/1
0.5 TABLET ORAL 2 TIMES DAILY PRN
Status: DISCONTINUED | OUTPATIENT
Start: 2024-06-04 | End: 2024-06-10 | Stop reason: HOSPADM

## 2024-06-04 RX ADMIN — TRAZODONE HYDROCHLORIDE 100 MG: 100 TABLET ORAL at 20:25

## 2024-06-04 RX ADMIN — BACITRACIN 1 APPLICATION: 500 OINTMENT TOPICAL at 20:25

## 2024-06-04 RX ADMIN — ARIPIPRAZOLE 7.5 MG: 5 TABLET ORAL at 08:18

## 2024-06-04 RX ADMIN — NORETHINDRONE ACETATE AND ETHINYL ESTRADIOL 1 TABLET: KIT at 08:16

## 2024-06-04 RX ADMIN — DULOXETINE HYDROCHLORIDE 60 MG: 60 CAPSULE, DELAYED RELEASE ORAL at 08:18

## 2024-06-04 ASSESSMENT — PAIN - FUNCTIONAL ASSESSMENT
PAIN_FUNCTIONAL_ASSESSMENT: 0-10
PAIN_FUNCTIONAL_ASSESSMENT: 0-10

## 2024-06-04 ASSESSMENT — PAIN SCALES - GENERAL
PAINLEVEL_OUTOF10: 0 - NO PAIN
PAINLEVEL_OUTOF10: 0 - NO PAIN

## 2024-06-04 NOTE — CARE PLAN
"  Problem: Risk for Suicide  Goal: Accepts medications as prescribed/needed this shift  Outcome: Progressing     Problem: Fall/Injury  Goal: Be free from injury by end of the shift  Outcome: Progressing   The patient's goals for the shift include \"Keep journaling and going to groups\"    The clinical goals for the shift include maintain safety    Over the shift, the patient did not make progress toward the following goals. The pt had an uneventful day, attending all groups. Q 15 minute monitoring continued.     "

## 2024-06-04 NOTE — GROUP NOTE
Group Topic: Chemical Dependency - Dual Diagnosis   Group Date: 6/4/2024  Start Time: 1600  End Time: 1700  Facilitators: MINA Elena   Department: [facility] REHAB THERAPY VIRTUAL    Number of Participants: 8   Group Focus: anxiety, chemical dependency education, coping skills, and dual diagnosis  Treatment Modality: Recreation Therapy  Interventions utilized were: Anxiety and Addiction Education, assignment, exploration, mental fitness, story telling, and support  Purpose: coping skills, feelings, insight or knowledge, and self-care    Name: Ruthy Mcrae YOB: 1995   MR: [id number]      Facilitator: Recreational Therapist  Level of Participation: active  Quality of Participation: appropriate/pleasant, attentive, cooperative, and engaged  Interactions with others: appropriate  Mood/Affect: appropriate and positive  Triggers (if applicable): N/A  Cognition: coherent/clear and logical  Progress: Moderate  Comments: This session involved education on anxiety and addiction related illnesses.  We discussed types of anxiety illnesses, general information about addiction, coping strategies, and specific treatment options/methods. Participants were provided a recovery activity to complete independently that provides a questionnaire related to anxiety disorders. Participants were encouraged to share how they may be able to relate or utilize the information being discussed.       Patient attended most of the session. She was willing to share her thoughts, opinions, and personal experiences. Patient completed all desired session tasks.     Plan: continue with services

## 2024-06-04 NOTE — PROGRESS NOTES
"Ruthy Mcrae is a 28 y.o. female on day 10 of admission presenting with Major depressive disorder, recurrent (CMS-HCC).      Subjective   The patient was seen and examined. I reviewed the chart and vital signs from overnight. I reviewed previous notes. I reviewed medications, administered overnight and their reported benefits or side effects. I met with the patient in the hallway chairs. Patient stated she is noting the anxiety is \"not sticking as long\". Reported 1 year ago felt anxiety with limited depression , rising, 6 months ago boyfriend cheated, 3 months ago she felt the depression starting with the neg thoughts \"stuck\". She feels that there is \"hope\". Reported no eps, asked to inc abilify.    Last Recorded Vitals  Blood pressure 103/72, pulse 108, temperature 36.2 °C (97.2 °F), temperature source Temporal, resp. rate 16, height 1.778 m (5' 10\"), weight 75.4 kg (166 lb 3.6 oz), SpO2 98%.    Review of Systems    Physical Exam  Mental Status Exam:   General: CF with ocd, anxiety, depression   Appearance: Appears stated age, in own attire fair grooming and hygiene  Attitude: cooperative, calm, engaged in her care   Behavior: tearful, help seeking, doing the work, addressing thoughts   Motor Activity: No eps or td. Normal gait/station. normal muscle tone/bulk.  Speech: Normal rate, volume, tone, spontaneous, fluent.  Mood: depression 4 and anxiety 0  Affect: reactive, anxious  Thought Process: Organized, linear, goal directed.  Thought Content: denied auditory and visual hallucinations, elicted ocd level thinking boardering on fixed delusions, ruminative-active  Thought Perception: denies hallucinations. does not appear to be responding to hallucinatory stimuli  Cognition: Alert, oriented x3. Adequate fund of knowledge. Grossly intact.   Insight: fair; as patient recognizes symptoms of illness and need for recommended treatments.  Judgment: fair; actively seeking help.     IMPRESSION:  SI  COLLIN  Major Depressive " disorder, severe with paranoid delusions  OCD     Plan:    Monitor sleep, anxiety  Luvox 25mg bid  Zyprexa 5mg at bedtime  Milieu therapy    5/30: luvox decreases enzymatic breakdown of Zyprexa increasing latter drug levels. Reduce Zyprexa to 2.5mg at bedtime and increase luvox to 50mg am and 25mg at bedtime today,  with goal to stop Zyprexa prior to discharge if possible   5/31: stop luvox, start Cymbalta 30mg in am, stop Zyprexa start Abilify 2mg not and 5mg in am  Start Remeron 15mg at bedtime for sleep  6/3/24: started abilify now at 7.5mg am no eps. Remeron 15mg for sleep. Monitoring decrease in depression and anxiety values. High anxiety noted, crying. Will continue to monitor.  6/4/24:  Inc abilify to 10mg am, reported noted decrease in sleep, on starting abilify. No eps but will add cogentin op on dc for prn akathesia  Dc remeron due to risk of weight gain.   Retrial trazodone 100mg at bedtime    Elos< 3 days    I spent 22 minutes in the professional and overall care of this patient.      Brittani Bernabe MD

## 2024-06-04 NOTE — GROUP NOTE
Group Topic: Cognitive Behavioral Therapy   Group Date: 6/4/2024  Start Time: 0730  End Time: 0810  Facilitators: MINA Elena   Department: [facility] REHAB THERAPY VIRTUAL    Number of Participants: 4   Group Focus: Behavior change, check in, daily focus, and goals  Treatment Modality: Recreation Therapy  Interventions utilized were: Secondary Gains, Letter to Self, exploration, story telling, and support  Purpose: coping skills, maladaptive thinking, and insight or knowledge    Name: Ruthy Mcrae YOB: 1995   MR: [id number]      Facilitator: Recreational Therapist  Level of Participation: did not attend  Progress: None  Comments: Group involved discussion and the use of a handout focusing on one’s secondary gains (problematic behaviors) and false rewards one might get from them. Participants were then asked to reflect and think of other solutions or ways to better manage secondary gain behaviors. The group was provided examples and opportunities to share information/goals.      Patient remained in their room throughout the session resting/sleeping.     Plan: continue with services

## 2024-06-04 NOTE — NURSING NOTE
"The pt was calm and cooperative during the interview that took place in the pt's room away from her peers for privacy. The pt stated, \"Not yet\" when asked about anxiety, depression she rated it a 4/10, denies SI, HI and AVH at this time as well as pain rating it a 0/10. Last bowel movement was, \"yesterday\" 6/3/24. Coping skills, \"Journaling.\" Goal for the day, \"Keep journaling and go to groups.\" Pt strength, \"I can speak Chinese.\" The pt was medication compliant with her morning medications. Q 15 minute monitoring continued.   "

## 2024-06-04 NOTE — CARE PLAN
"The patient's goals for the shift include \"attend all the groups tomorrow\"    The clinical goals for the shift include medication compliance    Over the shift, the patient did  make progress toward the following goals    Problem: Risk for Suicide  Goal: Accepts medications as prescribed/needed this shift  Outcome: Progressing     Problem: Risk for Suicide  Goal: Identifies supports this shift  Outcome: Progressing     Problem: Risk for Suicide  Goal: Makes needs known through verbalization or behaviors this shift  Outcome: Progressing     Problem: Risk for Suicide  Goal: No self harm this shift  Outcome: Progressing     Problem: Fall/Injury  Goal: Not fall by end of shift  Outcome: Progressing     Problem: Fall/Injury  Goal: Be free from injury by end of the shift  Outcome: Progressing     Problem: Altered Thought Processes as Evidenced by  Goal: STG - Desires improvement in ability to think and concentrate  Outcome: Progressing     Problem: Ineffective Coping  Goal: Identifies healthy coping skills  Outcome: Progressing     Problem: Ineffective Coping  Goal: Demonstrates healthy coping skills  Outcome: Progressing     "

## 2024-06-04 NOTE — GROUP NOTE
Group Topic: Gross Motor/Balance Skills   Group Date: 6/4/2024  Start Time: 1400  End Time: 1500  Facilitators: DIANDRA ElenaS   Department: [facility] REHAB THERAPY VIRTUAL    Number of Participants: 9   Group Focus: concentration, coping skills, leisure skills, problem solving, and social skills  Treatment Modality: Recreation Therapy  Interventions utilized were: Pop Darts with Over/Under Trivia, leisure development, mental fitness, and problem solving  Purpose: Physical Leisure, Cognitive Stimulation, Social Stimulation, Pleasurable Activity, Teamwork, coping skills and communication skills    Name: Ruthy Mcrae YOB: 1995   MR: [id number]      Facilitator: Recreational Therapist  Level of Participation: active  Quality of Participation: attentive, cooperative, and engaged  Interactions with others: appropriate and supportive  Mood/Affect: appropriate and positive  Triggers (if applicable): N/A  Cognition: coherent/clear and logical  Progress: Moderate  Comments:  This activity involved coordinating movements, cognitive tasks, social interactions, healthy competition, teamwork, leisure awareness, and a pleasurable experience.    Patient attended most of the session. She was able to complete all physical and cognitive tasks independently. Patient was willing to share her opinions about many of the trivia questions and made effective arguments on why she thought certain answers. Patient completed all desired session group tasks.     Plan: continue with services

## 2024-06-04 NOTE — NURSING NOTE
Patient is in her room sitting in her bed reading. Pt appears bright ad is smiling. Pt stated she had a good day and her parents brought her a bunch of her books. Pt rated anxiety 4,depression 5, denied si/hi,denied a/v hallucinations. Pt is medication compliant.

## 2024-06-04 NOTE — PROGRESS NOTES
"Occupational Therapy     REHAB Therapy Assessment & Treatment    Patient Name: Ruthy Mcrae  MRN: [id number]  Today's Date: 6/4/2024      Activity Assessment:     CBT Skills and Cognitive Distortions Group: [id number]  Progressive Muscle Relaxation Group: [id number]  Problem Solving Skills/ Leisure Engagement Group: [id number]    3/3 Groups attended    Pt present in all groups with continued improvement in mood/brighter affect, increased social interaction, and improving insight. Pt with G understanding of CBT skills as well as able to share aloud what she has heard before about CBT. Pt then able to complete cognitive distortions handout to improve awareness of negative thoughts vs positive mindset. Pt shares negative thought she has frequently said to herself and \"has been said to me by a previous boyfriend that I am ugly and I could be thinner\" Pt has G awareness that \"this comment is untrue\" as well as able to ID X3 pieces of evidence contrary to the negative core belief sharing \"I am unique, Nikki dated other people who found me attractive, and I have an objectively good looking body\" Pt then engages well with continued G understanding of PMR task and shares \"feeling so much better\" following same. Pt continues to demo G progress toward OT goals as evidenced above. Pt would benefit from continued OT services in order to improve overall self-esteem, personal confidence and positive supports for safe transition at discharge.      Encounter Problems       Encounter Problems (Active)       OT Goals       Pt will ID 2 community resources/programs to join/attend after D/C to improve their support system.  (Progressing)       Start:  05/27/24    Expected End:  06/10/24            Pt will demo/ID 2-3 ways to improve effectiveness in completing tasks and responsibilities, while focusing attention on the present.   (Progressing)       Start:  05/27/24    Expected End:  06/10/24            Pt will explore and ID 1-2 " strategies to manage stressors/symptoms of illness/ grief more effectively prior to discharge.   (Progressing)       Start:  05/27/24    Expected End:  06/10/24            Pt will explore and ID 1-2 effective methods of expressing feelings, want and needs prior to discharge.   (Progressing)       Start:  05/27/24    Expected End:  06/10/24                       Additional Comments:  DUQUE collaborated with patients nurse and charge nurse throughout the day to provide appropriate support and encouragement to attend groups. Pt up on unit when DUQUE left last group of the day. All needs met.

## 2024-06-04 NOTE — CARE PLAN
Discussed in Treatment team today;  still anxious; not yet at baseline;  sw spoke to her and she stated she thinks she is improving; she gave her work excuse paperwork for Dr. Bernabe to complete asap.  Discharge maybe later this week.  Will refer to [facility] and then CBT IOP as step down plan at discharge; will call [facility] about an individual CBT therapist also. Sw to follow.

## 2024-06-05 PROCEDURE — 1240000001 HC SEMI-PRIVATE BH ROOM DAILY

## 2024-06-05 PROCEDURE — 2500000001 HC RX 250 WO HCPCS SELF ADMINISTERED DRUGS (ALT 637 FOR MEDICARE OP): Performed by: PSYCHIATRY & NEUROLOGY

## 2024-06-05 PROCEDURE — 99232 SBSQ HOSP IP/OBS MODERATE 35: CPT | Performed by: PSYCHIATRY & NEUROLOGY

## 2024-06-05 PROCEDURE — 97150 GROUP THERAPEUTIC PROCEDURES: CPT | Mod: GO,CO

## 2024-06-05 PROCEDURE — 2500000001 HC RX 250 WO HCPCS SELF ADMINISTERED DRUGS (ALT 637 FOR MEDICARE OP): Performed by: NURSE PRACTITIONER

## 2024-06-05 PROCEDURE — 2500000002 HC RX 250 W HCPCS SELF ADMINISTERED DRUGS (ALT 637 FOR MEDICARE OP, ALT 636 FOR OP/ED): Performed by: PSYCHIATRY & NEUROLOGY

## 2024-06-05 RX ORDER — TRAZODONE HYDROCHLORIDE 100 MG/1
200 TABLET ORAL NIGHTLY
Status: DISCONTINUED | OUTPATIENT
Start: 2024-06-05 | End: 2024-06-07

## 2024-06-05 RX ORDER — ARIPIPRAZOLE 5 MG/1
7.5 TABLET ORAL EVERY 24 HOURS
Status: DISCONTINUED | OUTPATIENT
Start: 2024-06-06 | End: 2024-06-10 | Stop reason: HOSPADM

## 2024-06-05 RX ORDER — BENZTROPINE MESYLATE 1 MG/1
1 TABLET ORAL ONCE
Status: COMPLETED | OUTPATIENT
Start: 2024-06-05 | End: 2024-06-05

## 2024-06-05 RX ORDER — BENZTROPINE MESYLATE 0.5 MG/1
0.5 TABLET ORAL 2 TIMES DAILY
Status: DISCONTINUED | OUTPATIENT
Start: 2024-06-06 | End: 2024-06-06

## 2024-06-05 RX ADMIN — TRAZODONE HYDROCHLORIDE 200 MG: 100 TABLET ORAL at 20:29

## 2024-06-05 RX ADMIN — NORETHINDRONE ACETATE AND ETHINYL ESTRADIOL 1 TABLET: KIT at 08:49

## 2024-06-05 RX ADMIN — BACITRACIN 1 APPLICATION: 500 OINTMENT TOPICAL at 20:29

## 2024-06-05 RX ADMIN — DULOXETINE HYDROCHLORIDE 60 MG: 60 CAPSULE, DELAYED RELEASE ORAL at 08:50

## 2024-06-05 RX ADMIN — BENZTROPINE MESYLATE 1 MG: 1 TABLET ORAL at 20:29

## 2024-06-05 RX ADMIN — ARIPIPRAZOLE 10 MG: 10 TABLET ORAL at 06:37

## 2024-06-05 ASSESSMENT — PAIN SCALES - GENERAL
PAINLEVEL_OUTOF10: 0 - NO PAIN
PAINLEVEL_OUTOF10: 0 - NO PAIN

## 2024-06-05 ASSESSMENT — PAIN - FUNCTIONAL ASSESSMENT
PAIN_FUNCTIONAL_ASSESSMENT: 0-10
PAIN_FUNCTIONAL_ASSESSMENT: 0-10

## 2024-06-05 NOTE — PROGRESS NOTES
"Ruthy Mcrae is a 28 y.o. female on day 11 of admission presenting with Major depressive disorder, recurrent (CMS-HCC).      Subjective   The patient was seen and examined. I reviewed the chart and vital signs from overnight. I reviewed previous notes. I reviewed medications, administered overnight and their reported benefits or side effects. I met with the patient in the hallway chairs. Patient stated she is feeling the intensity of her emotions \"under the surface\" seems to be less intense that she can focus bit better. Reported upper lip trembling, decreased sleep on increasing abiify today to 10mg.      Last Recorded Vitals  Blood pressure 102/69, pulse 98, temperature 36.8 °C (98.2 °F), temperature source Temporal, resp. rate 16, height 1.778 m (5' 10\"), weight 75.4 kg (166 lb 3.6 oz), SpO2 98%.    Review of Systems    Physical Exam  Mental Status Exam:   General: CF with ocd, anxiety, depression   Appearance: Appears stated age, in own attire fair grooming and hygiene  Attitude: cooperative, calm, engaged in her care   Behavior: tearful, help seeking, doing the work, addressing thoughts   Motor Activity: +eps  upper lip, no td. Normal gait/station. normal muscle tone/bulk.  Speech: Normal rate, volume, tone, spontaneous, fluent.  Mood: depression 5 and anxiety 3  Affect: reactive, anxious  Thought Process: Organized, linear, goal directed.  Thought Content: denied auditory and visual hallucinations, elicted ocd level thinking boardering on fixed delusions, ruminative-active  Thought Perception: denies hallucinations. does not appear to be responding to hallucinatory stimuli  Cognition: Alert, oriented x3. Adequate fund of knowledge. Grossly intact.   Insight: fair; as patient recognizes symptoms of illness and need for recommended treatments.  Judgment: fair; actively seeking help.     IMPRESSION:  SI  COLLIN  Major Depressive disorder, severe with paranoid delusions  OCD     Plan:    Monitor sleep, " anxiety  Luvox 25mg bid  Zyprexa 5mg at bedtime  Milieu therapy    5/30: luvox decreases enzymatic breakdown of Zyprexa increasing latter drug levels. Reduce Zyprexa to 2.5mg at bedtime and increase luvox to 50mg am and 25mg at bedtime today,  with goal to stop Zyprexa prior to discharge if possible   5/31: stop luvox, start Cymbalta 30mg in am, stop Zyprexa start Abilify 2mg not and 5mg in am  Start Remeron 15mg at bedtime for sleep  6/3/24: started abilify now at 7.5mg am no eps. Remeron 15mg for sleep. Monitoring decrease in depression and anxiety values. High anxiety noted, crying. Will continue to monitor.  6/4/24:  Inc abilify to 10mg am, reported noted decrease in sleep, on starting abilify. No eps but will add cogentin op on dc for prn akathesia  Dc remeron due to risk of weight gain.   Retrial trazodone 100mg at bedtime  6/4: reduc abilify to 7.5mg am, start cogentin 0.5mg bid in am, 1mg tonight, inc trazodone 100mg to 200mg at bedtime    I spent 22 minutes in the professional and overall care of this patient.      Brittani Bernabe MD

## 2024-06-05 NOTE — NURSING NOTE
Pt took her night time medications  Pt read some more of her book and then went to bed  Pt slept all night long Pt had an uneventful night

## 2024-06-05 NOTE — NURSING NOTE
"Pt interview in the front unge  Pt is reading her book and eating her snack  Pt stated her day  was \"Pretty good\"  Pt rated her anxiety 3/10  depression 3/10  and denied everything else at this time  Pt stated her goal \"to read my book\"  her strength \" Im good at learning languages \"  and he coping skill \"I progress muscle relaxation\" Pt is appropriate with her answers to the questions at this time  "

## 2024-06-05 NOTE — CARE PLAN
"The patient's goals for the shift include \"to read my book\"    The clinical goals for the shift include medication compliance    Over the shift, the patient did not make progress toward the following goals. Barriers to progression include lack of medication knowledge. Recommendations to address these barriers include more education on medications.    "

## 2024-06-05 NOTE — NURSING NOTE
"The pt was calm and cooperative during the interview that took place in the pt's room away from her peers for privacy. The pt rated her anxiety a 5/10, depression 3/10, denies SI, HI and AVH at this time as well as pain rating it a 0/10. Last bowel movement was, \"yesterday\" 6/4/24. Coping skills, \"Journaling.\" Goal for the day, \"Keep on track with continuing journaling.\" Pt strength, \"I am a good phan.\" The pt was medication compliant with her morning medications. The pt did state she did not sleep well and she thinks it's the Abilify. Q 15 minute monitoring continued.   "

## 2024-06-05 NOTE — PROGRESS NOTES
"Occupational Therapy     REHAB Therapy Assessment & Treatment    Patient Name: Ruthy Mcrae  MRN: [id number]  Today's Date: 6/5/2024      Activity Assessment:   Self-Healing/ Motivational Coping Group: [id number]  Gratitude Jar Group: [id number]  Cognitive Task/ Team Building (Guess It Game) Group: [id number]    3/3 Groups attended     Pt present and participates well in all groups with much improved initiation and comfort level when sharing aloud, continued help seeking behaviors, and improved mood/brighter affect. Pt shares understanding of motivating videos as well as asks appropriate questions of \"how do I heal, I know what needs to heal but I am still struggling with the how\" Pt provided further examples and education with G understanding. Pt receptive to gratitude education as well as able to create a list of statements of gratitude to use for home carry over. Pt takes appropriate leadership role throughout cog task group as well as shares \"really enjoying playing games\" Pt with overall improved balance of tasks/discussions, insight, and improved brighter affect more consistently. Pt would benefit from continued OT services in order to improve overall self-esteem, personal confidence and positive supports for safe transition at discharge.        Encounter Problems       Encounter Problems (Active)       OT Goals       Pt will ID 2 community resources/programs to join/attend after D/C to improve their support system.  (Progressing)       Start:  05/27/24    Expected End:  06/10/24            Pt will demo/ID 2-3 ways to improve effectiveness in completing tasks and responsibilities, while focusing attention on the present.   (Progressing)       Start:  05/27/24    Expected End:  06/10/24            Pt will explore and ID 1-2 strategies to manage stressors/symptoms of illness/ grief more effectively prior to discharge.   (Progressing)       Start:  05/27/24    Expected End:  06/10/24            Pt will explore and " ID 1-2 effective methods of expressing feelings, want and needs prior to discharge.   (Progressing)       Start:  05/27/24    Expected End:  06/10/24                         Additional Comments:  DUQUE collaborated with patients nurse and charge nurse throughout the day to provide appropriate support and encouragement to attend groups. Pt up on unit when DUQUE left last group of the day. All needs met.

## 2024-06-05 NOTE — GROUP NOTE
Group Topic: Illness Education   Group Date: 6/5/2024  Start Time: 1600  End Time: 1700  Facilitators: MINA Elena   Department: [facility] REHAB THERAPY VIRTUAL    Number of Participants: 6   Group Focus: concentration, personal responsibility, relapse prevention, and self-awareness  Treatment Modality: Recreation Therapy  Interventions utilized were: Living Skills Recovery Components, clarification and exploration  Purpose: Treatment Planning/Education, Prioritization, insight or knowledge, self-care, and relapse prevention strategies    Name: Ruthy Mcrae YOB: 1995   MR: [id number]      Facilitator: Recreational Therapist  Level of Participation: did not attend  Progress: None  Comments: Patients were provided a handout to rank recovery components 1 through 9 (1 being most important).  The components included: clinical care/treatment plan, overcoming stigma, peer support/relationships, family support, community involvement, work/meaningful activity, power/control, education, and access to resources. The group was provided examples and encouraged to share thoughts related to each of the areas. Participants were provided the time to identify and share rankings.      Patient remained in their room throughout the session for unknown reasons.     Plan: continue with services

## 2024-06-05 NOTE — GROUP NOTE
"Group Topic: Art Creative   Group Date: 6/5/2024  Start Time: 1400  End Time: 1500  Facilitators: DIANDRA ElenaS   Department: [facility] REHAB THERAPY VIRTUAL    Number of Participants: 8   Group Focus: coping skills, feeling awareness/expression, and leisure skills  Treatment Modality: Recreation Therapy  Interventions utilized were: Art Project \"Joyful 5\", Spark Vince Handout, exploration, leisure development, and support  Purpose: Feel Vince, Leisure Awareness/Education, coping skills, insight or knowledge, and self-care    Name: Ruthy Mcrae YOB: 1995   MR: [id number]      Facilitator: Recreational Therapist  Level of Participation: active  Quality of Participation: appropriate/pleasant and cooperative  Interactions with others: appropriate and supportive  Mood/Affect: appropriate  Triggers (if applicable): N/A  Cognition: logical  Progress: Moderate  Comments: This session included a creative leisure awareness activity called \"Joyful 5\". Participants were asked to creatively express five things that bring them vince. Words and imagery were created via crayons, markers, sharpies, colored pencils, and water color paints. Patient were asked to share their creations and then the group all together discussed that many of these \"representations of vince\" are leisure lifestyle concepts.  A handout which included examples of activities that may provide everyday vince or happiness was provided. Handout topics were  into categories including: relax and recharge, connect and create, learn and grow, and self-care and mindfulness. Participants highlighted activities they had interest in and/or would possibly enjoy trying.     Patient completed all desired session tasks.     Plan: continue with services      "

## 2024-06-06 PROCEDURE — 2500000001 HC RX 250 WO HCPCS SELF ADMINISTERED DRUGS (ALT 637 FOR MEDICARE OP): Performed by: NURSE PRACTITIONER

## 2024-06-06 PROCEDURE — 2500000002 HC RX 250 W HCPCS SELF ADMINISTERED DRUGS (ALT 637 FOR MEDICARE OP, ALT 636 FOR OP/ED): Performed by: PSYCHIATRY & NEUROLOGY

## 2024-06-06 PROCEDURE — 1240000001 HC SEMI-PRIVATE BH ROOM DAILY

## 2024-06-06 PROCEDURE — 97150 GROUP THERAPEUTIC PROCEDURES: CPT | Mod: GO,CO

## 2024-06-06 PROCEDURE — 2500000001 HC RX 250 WO HCPCS SELF ADMINISTERED DRUGS (ALT 637 FOR MEDICARE OP): Performed by: PSYCHIATRY & NEUROLOGY

## 2024-06-06 PROCEDURE — 99232 SBSQ HOSP IP/OBS MODERATE 35: CPT | Performed by: PSYCHIATRY & NEUROLOGY

## 2024-06-06 RX ORDER — BENZTROPINE MESYLATE 0.5 MG/1
0.5 TABLET ORAL DAILY
Status: DISCONTINUED | OUTPATIENT
Start: 2024-06-07 | End: 2024-06-07

## 2024-06-06 RX ADMIN — BENZTROPINE MESYLATE 0.5 MG: 0.5 TABLET ORAL at 09:37

## 2024-06-06 RX ADMIN — DULOXETINE HYDROCHLORIDE 60 MG: 60 CAPSULE, DELAYED RELEASE ORAL at 09:37

## 2024-06-06 RX ADMIN — TRAZODONE HYDROCHLORIDE 200 MG: 100 TABLET ORAL at 20:25

## 2024-06-06 RX ADMIN — NORETHINDRONE ACETATE AND ETHINYL ESTRADIOL 1 TABLET: KIT at 09:38

## 2024-06-06 RX ADMIN — ARIPIPRAZOLE 7.5 MG: 5 TABLET ORAL at 06:23

## 2024-06-06 ASSESSMENT — PAIN SCALES - GENERAL
PAINLEVEL_OUTOF10: 0 - NO PAIN
PAINLEVEL_OUTOF10: 0 - NO PAIN

## 2024-06-06 ASSESSMENT — PAIN - FUNCTIONAL ASSESSMENT
PAIN_FUNCTIONAL_ASSESSMENT: 0-10
PAIN_FUNCTIONAL_ASSESSMENT: 0-10

## 2024-06-06 NOTE — NURSING NOTE
"[facility] interview [facility]  Pt is playing KYUNG with other patients  She stated her day was \"alright\"  She rated her anxiety 4/10  depression 5/10  and denied everything else at this time  Pt stated her goal \"get sleep\"  her strength \"I bike\"  and he coping skill \"I deep breath\"  Pt is appropriate with her answers to the questions at this time.   "

## 2024-06-06 NOTE — GROUP NOTE
Group Topic: Dialectical Behavioral Therapy - Distress Tolerance   Group Date: 6/6/2024  Start Time: 1615  End Time: 1700  Facilitators: MINA Negro   Department: [facility] REHAB THERAPY VIRTUAL    Number of Participants: 10   Group Focus: coping skills and relaxation  Treatment Modality: Other: Recreational Therapy   Interventions utilized were group exercise, patient education, and support  Purpose: coping skills and other: self-soothing, distress tolerance     Name: Ruthy Mcrae YOB: 1995   MR: [id number]      Facilitator: Recreational Therapist  Level of Participation: active  Quality of Participation: appropriate/pleasant, cooperative, and engaged  Interactions with others: appropriate  Mood/Affect: appropriate and positive  Triggers (if applicable): n/a  Cognition: coherent/clear  Progress: Moderate  Comments: Patients were provided with the DBT - Distress Tolerance Handout “Self-Soothing” with our five senses. We discussed ways to self-soothe in each of the five categories (Vision, Hearing, Smell, Taste, Touch).     Pt was pleasant, social, and engaged in discussion. She was able to identify a variety of self-soothing skills she utilizes and showed good insight.     Plan: continue with services

## 2024-06-06 NOTE — PROGRESS NOTES
"Occupational Therapy     REHAB Therapy Assessment & Treatment    Patient Name: Ruthy Mcrae  MRN: [id number]  Today's Date: 6/6/2024      Activity Assessment:   Community Resources/ Positive Supports Group: [id number]  Healthy vs Unhealthy Coping Skills Group: [id number]  Pet Therapy as a Coping Tool Group: [id number]    3/3 Groups attended      Pt present in all groups with continued improvement in self confidence, insight, and more consistent brighter affect/mood throughout the course of the groups. Pt with G understanding and G recall of previous CR education as well as takes the initiative to share what she previously learned re: certain resources. Pt receptive to new information as well as handouts/fliers provided in same group. Pt then able to share aloud the difference between healthy vs unhealthy coping aloud sharing \"journaling\" as a healthy coping and \"waiting for things to change instead making a change yourself\" as an unhealthy coping tool. Pt continues to demo G understanding and engagement 100% of the time during pet therapy group as well as consistent improved mood/affect 100% of the time when engaging with the pet therapy dog as well as initiates appropriate discussion re: the benefits of pet therapy. Pt continues to make G progress toward OT goals as evidenced above. Pt would benefit from continued OT services in order to improve overall self-esteem, personal confidence and positive supports for safe transition at discharge.      Encounter Problems       Encounter Problems (Active)       OT Goals       Pt will ID 2 community resources/programs to join/attend after D/C to improve their support system.  (Progressing)       Start:  05/27/24    Expected End:  06/10/24            Pt will demo/ID 2-3 ways to improve effectiveness in completing tasks and responsibilities, while focusing attention on the present.   (Progressing)       Start:  05/27/24    Expected End:  06/10/24            Pt will explore and " ID 1-2 strategies to manage stressors/symptoms of illness/ grief more effectively prior to discharge.   (Progressing)       Start:  05/27/24    Expected End:  06/10/24            Pt will explore and ID 1-2 effective methods of expressing feelings, want and needs prior to discharge.   (Progressing)       Start:  05/27/24    Expected End:  06/10/24                         Additional Comments:  DUQUE collaborated with patients nurse and charge nurse throughout the day to provide appropriate support and encouragement to attend groups. Pt up on unit when DUQUE left last group of the day. All needs met.

## 2024-06-06 NOTE — CARE PLAN
"The patient's goals for the shift include \" read my book\"    The clinical goals for the shift include maintain safety    Over the shift, the patient did not make progress toward the following goals. Barriers to progression include acute illness. Recommendations to address these barriers include continue medication as ordered.    "

## 2024-06-06 NOTE — PROGRESS NOTES
"Ruthy Mcrae is a 28 y.o. female on day 12 of admission presenting with Major depressive disorder, recurrent (CMS-HCC).      Subjective   The patient was seen and examined. I reviewed the chart and vital signs from overnight. I reviewed previous notes. I reviewed medications, administered overnight and their reported benefits or side effects. I met with the patient in the hallway chairs. Patient stated she is feeling the intensity of her emotions \"under the surface\" seems to be less intense that she can focus bit better. Reported upper lip trembling, has decreased some, dry mouth. No tremor at night. Sleep better last night on trazodone 200mg.      Last Recorded Vitals  Blood pressure 92/61, pulse 102, temperature 36.4 °C (97.5 °F), resp. rate 16, height 1.778 m (5' 10\"), weight 75.4 kg (166 lb 3.6 oz), SpO2 97%.    Review of Systems    Physical Exam  Mental Status Exam:   General: CF with ocd, anxiety, depression   Appearance: Appears stated age, in own attire fair grooming and hygiene  Attitude: cooperative, calm, engaged in her care   Behavior: tearful, help seeking, doing the work, addressing thoughts   Motor Activity: +eps  upper lip, no td. Normal gait/station. normal muscle tone/bulk.  Speech: Normal rate, volume, tone, spontaneous, fluent.  Mood: depression 4 and anxiety 4  Affect: reactive, anxious  Thought Process: Organized, linear, goal directed.  Thought Content: denied auditory and visual hallucinations, elicted ocd level thinking boardering on fixed delusions, ruminative-active  Thought Perception: denies hallucinations. does not appear to be responding to hallucinatory stimuli  Cognition: Alert, oriented x3. Adequate fund of knowledge. Grossly intact.   Insight: fair; as patient recognizes symptoms of illness and need for recommended treatments.  Judgment: fair; actively seeking help.     IMPRESSION:  SI  COLLIN  Major Depressive disorder, severe with paranoid delusions  OCD     Plan:    Monitor " sleep, anxiety  Luvox 25mg bid  Zyprexa 5mg at bedtime  Milieu therapy    5/30: luvox decreases enzymatic breakdown of Zyprexa increasing latter drug levels. Reduce Zyprexa to 2.5mg at bedtime and increase luvox to 50mg am and 25mg at bedtime today,  with goal to stop Zyprexa prior to discharge if possible   5/31: stop luvox, start Cymbalta 30mg in am, stop Zyprexa start Abilify 2mg not and 5mg in am  Start Remeron 15mg at bedtime for sleep  6/3/24: started abilify now at 7.5mg am no eps. Remeron 15mg for sleep. Monitoring decrease in depression and anxiety values. High anxiety noted, crying. Will continue to monitor.  6/4/24:  Inc abilify to 10mg am, reported noted decrease in sleep, on starting abilify. No eps but will add cogentin op on dc for prn akathesia  Dc remeron due to risk of weight gain.   Retrial trazodone 100mg at bedtime  6/5: reduc abilify to 7.5mg am, start cogentin 0.5mg bid in am, 1mg tonight, inc trazodone 100mg to 200mg at bedtime  6/6: monitor for sleep not to break through trazodone over weekend, as it increased cycling, inc ruminations and depressive relapse.    Elos < 4 days    I spent 22 minutes in the professional and overall care of this patient.      Brittani Bernabe MD

## 2024-06-06 NOTE — GROUP NOTE
"Group Topic: Goals   Group Date: 6/6/2024  Start Time: 0730  End Time: 0810  Facilitators: MINA Negro   Department: [facility] REHAB THERAPY VIRTUAL    Number of Participants: 5   Group Focus: daily focus, goals, and personal responsibility  Treatment Modality: Other: Recreational Therapy   Interventions utilized were exploration and support  Purpose: other: daily focus/goals, gratitude, positive intentions     Name: Ruthy Mcrae YOB: 1995   MR: [id number]      Facilitator: Recreational Therapist  Level of Participation: did not attend  Progress: None  Comments: Patients were provided with the \"Daily Check In\" handout for morning goal group. The handout includes 4 main sections - (current mood, gratitude, goals, and intentions for the day).    Patient declined invitation to group activity at this time. Patient will continue to be provided with opportunities to enhance leisure skills and/or coping mechanisms.    Plan: continue with services      "

## 2024-06-06 NOTE — NURSING NOTE
Pt took her night time medications  She played cards with other patients and then went to sleep  Pt slept all night long Pt had an uneventful night

## 2024-06-06 NOTE — GROUP NOTE
"Group Topic: Leisure Skills   Group Date: 6/6/2024  Start Time: 1400  End Time: 1500  Facilitators: DIANDRA NegroS   Department: [facility] REHAB THERAPY VIRTUAL    Number of Participants: 8   Group Focus: leisure skills and social skills  Treatment Modality: Other: Recreational Therapy   Interventions utilized were exploration and leisure development  Purpose: other: leisure awareness, cognitive skills/focus, social engagement, healthy competition     Name: Ruthy Mcrae YOB: 1995   MR: [id number]      Facilitator: Recreational Therapist  Level of Participation: active  Quality of Participation: appropriate/pleasant, cooperative, and engaged  Interactions with others: appropriate  Mood/Affect: appropriate and positive  Triggers (if applicable): n/a  Cognition: coherent/clear  Progress: Moderate  Comments: Patients were gathered to learn and participate in the game \"Mr. Butler\". This activity works on cognitive skills, following directions, positive social interaction/teamwork, and promotes leisure awareness.    Pt was calm, cooperative, and engaged in group task. She was pleasant/social with peers throughout and appeared to enjoy the competitiveness of the game.     Plan: continue with services      "

## 2024-06-06 NOTE — NURSING NOTE
"Pt was assessed this morning in the hallway away from peers for privacy. She presents as pleasant and engaged in conversation. She rates both anxiety and depression 4/10 with no SI/HI or AVH reported. Pt has been both medication and group compliant. She has been observed out on the unit and has been social with select peers. Pt reports sleeping better last night and feels her medication is working. Pt states she \" feels more calm\" than before. Her goal for today was \" read my book\", her coping skill was \" CBT journaling\", and she identified strengths as \" I'm a decent cook and a good listener\". Her appetite has been good and she reports last BM on 6/5. Will continue to monitor for safety and encourage group participation.  "

## 2024-06-07 PROBLEM — F33.9 MAJOR DEPRESSIVE DISORDER, RECURRENT (CMS-HCC): Status: RESOLVED | Noted: 2024-05-25 | Resolved: 2024-06-07

## 2024-06-07 PROCEDURE — 1240000001 HC SEMI-PRIVATE BH ROOM DAILY

## 2024-06-07 PROCEDURE — 97150 GROUP THERAPEUTIC PROCEDURES: CPT | Mod: GO,CO

## 2024-06-07 PROCEDURE — 2500000002 HC RX 250 W HCPCS SELF ADMINISTERED DRUGS (ALT 637 FOR MEDICARE OP, ALT 636 FOR OP/ED): Performed by: PSYCHIATRY & NEUROLOGY

## 2024-06-07 PROCEDURE — 2500000001 HC RX 250 WO HCPCS SELF ADMINISTERED DRUGS (ALT 637 FOR MEDICARE OP): Performed by: NURSE PRACTITIONER

## 2024-06-07 PROCEDURE — 99232 SBSQ HOSP IP/OBS MODERATE 35: CPT | Performed by: PSYCHIATRY & NEUROLOGY

## 2024-06-07 PROCEDURE — 2500000001 HC RX 250 WO HCPCS SELF ADMINISTERED DRUGS (ALT 637 FOR MEDICARE OP): Performed by: PSYCHIATRY & NEUROLOGY

## 2024-06-07 RX ORDER — BENZTROPINE MESYLATE 0.5 MG/1
0.75 TABLET ORAL DAILY
Status: DISCONTINUED | OUTPATIENT
Start: 2024-06-08 | End: 2024-06-10 | Stop reason: HOSPADM

## 2024-06-07 RX ADMIN — NORETHINDRONE ACETATE AND ETHINYL ESTRADIOL 1 TABLET: KIT at 09:26

## 2024-06-07 RX ADMIN — BACITRACIN 1 APPLICATION: 500 OINTMENT TOPICAL at 17:16

## 2024-06-07 RX ADMIN — BACITRACIN 1 APPLICATION: 500 OINTMENT TOPICAL at 09:29

## 2024-06-07 RX ADMIN — TRAZODONE HYDROCHLORIDE 250 MG: 100 TABLET ORAL at 21:16

## 2024-06-07 RX ADMIN — DULOXETINE HYDROCHLORIDE 60 MG: 60 CAPSULE, DELAYED RELEASE ORAL at 09:26

## 2024-06-07 RX ADMIN — ARIPIPRAZOLE 7.5 MG: 5 TABLET ORAL at 06:01

## 2024-06-07 RX ADMIN — BENZTROPINE MESYLATE 0.5 MG: 0.5 TABLET ORAL at 09:26

## 2024-06-07 ASSESSMENT — PAIN SCALES - GENERAL
PAINLEVEL_OUTOF10: 0 - NO PAIN
PAINLEVEL_OUTOF10: 0 - NO PAIN

## 2024-06-07 ASSESSMENT — PAIN - FUNCTIONAL ASSESSMENT
PAIN_FUNCTIONAL_ASSESSMENT: 0-10
PAIN_FUNCTIONAL_ASSESSMENT: 0-10

## 2024-06-07 NOTE — DISCHARGE INSTR - APPOINTMENTS
Follow Up Outpatient Mental Health Services:    Walk-In Assessment for PHP/IOP Services.  On   Tuesday, June 11, 2024 at 9:00 AM  At [facility],  [address] [place] [address].  P. [phone];  F. [phone];    2.    Individual Therapy, CBT (cognitive behavioral therapy) with Hansa Dodson,  On Tuesday, June 18th, 2024 at 8:00 AM  And   3.    Psychiatry Assessment with Lynne Parekh  On Wednesday, June 19th, 2024 at 8:00 AM    BOTH At   [facility],  [address] [place] [address] [address],   [place] [address].    P. [phone];  F [phone];

## 2024-06-07 NOTE — CARE PLAN
Met with patient's biological mother and patient during visitation;  educated mom and patient on follow up referrals to outpatient counseling and PHP/IOP;  mom asked if this sw can advocate for discharge tomorrow (Friday) so pt can come home over the weekend.  Told her would ask but Dr. Bernabe makes the final call on discharge;  Patient stated she is feeling better and mom agreed seems better;  Sw to follow.

## 2024-06-07 NOTE — NURSING NOTE
Patient has been up and out on the unit this shift for group therapies and meals, quiet demeanor and positive attitude.  Patient states she like journaling and reading and her strength is that she is a good listener.  Anxiety and depression both 5/10.  Negative for suicidal ideation on assessment.

## 2024-06-07 NOTE — NURSING NOTE
Pt took her night time medications and worked on her puzzle  Pt went to bed and slept all night long  Pt had an uneventful night

## 2024-06-07 NOTE — NURSING NOTE
"[facility]  Pt is doing a puzzle and sitting quietly  Pt stated her day was \"decent\"  She rate her anxiety 5/10  depression 5/10 and denied everything else at this time  Pt stated her goal was \"get sleep\"  her strength \" I like to read\"  and her coping skill \"I journal\"  Pt is appropriate with her answers to the questions at this time   "

## 2024-06-07 NOTE — GROUP NOTE
"Group Topic: Gross Motor/Balance Skills   Group Date: 6/7/2024  Start Time: 1400  End Time: 1500  Facilitators: DIANDRA NegroS   Department: [facility] REHAB THERAPY VIRTUAL    Number of Participants: 12   Group Focus: leisure skills and social skills  Treatment Modality: Other: Recreational Therapy   Interventions utilized were exploration and leisure development  Purpose: other: leisure awareness, motor skills, healthy competition, social engagement     Name: Ruthy Mcrae YOB: 1995   MR: [id number]      Facilitator: Recreational Therapist  Level of Participation: active  Quality of Participation: appropriate/pleasant, cooperative, and engaged  Interactions with others: appropriate  Mood/Affect: appropriate and positive  Triggers (if applicable): n/a  Cognition: coherent/clear  Progress: Moderate  Comments: Patients were gathered and encouraged to actively participate in \"Saucer Smash\". This activity works on motor skills/coordinating movements, healthy competition, social interaction, and provides an overall pleasurable experience. Following the physical activity, patients were an opportunity to select a creative independent activity from the Art Cart.    Pt was actively engaged in physical activity. Pleasant and social with peers throughout. Following activity, she focused on a beaded bracelet project, which she was making for a peer.     Plan: continue with services      "

## 2024-06-07 NOTE — CARE PLAN
"The patient's goals for the shift include \"get sleep\"    The clinical goals for the shift include medication compliance    Over the shift, the patient did not make progress toward the following goals. Barriers to progression include lack of medication knowledge . Recommendations to address these barriers include more education on medications.    "

## 2024-06-07 NOTE — PROGRESS NOTES
"Occupational Therapy     REHAB Therapy Assessment & Treatment    Patient Name: Ruthy Mcrae  MRN: [id number]  Today's Date: 6/7/2024      Activity Assessment:   Vision Board/ Creative Art and Music Group: [id number]  Positive Affirmations and Mindset Group: [id number]  Cognitive Task/ Team Collaboration Group: [id number]    3/3 Groups attended     Pt present and engages well in all groups with continued G attention to task, continued help seeking behaviors, and continued improved insight. Pt with G understanding of vision board reasoning as well as able to provide personal examples of what affirmations mean in her own words. Pt creates a positive vision board to utilize as a new coping tool during admission and for discharge home. Pt continues to demo improved leadership role 100% of the time with continued G understanding of benefit of leisure tasks as a positive distraction and shares \"really enjoying that\" following. Pt continues to make improvement toward OT goals as evidenced above. Pt would benefit from continued OT services in order to improve overall self-esteem, personal confidence and positive supports for safe transition at discharge.      Encounter Problems       Encounter Problems (Active)       OT Goals       Pt will ID 2 community resources/programs to join/attend after D/C to improve their support system.  (Progressing)       Start:  05/27/24    Expected End:  06/10/24            Pt will demo/ID 2-3 ways to improve effectiveness in completing tasks and responsibilities, while focusing attention on the present.   (Progressing)       Start:  05/27/24    Expected End:  06/10/24            Pt will explore and ID 1-2 strategies to manage stressors/symptoms of illness/ grief more effectively prior to discharge.   (Progressing)       Start:  05/27/24    Expected End:  06/10/24            Pt will explore and ID 1-2 effective methods of expressing feelings, want and needs prior to discharge.   (Progressing)  "      Start:  05/27/24    Expected End:  06/10/24                     Additional Comments:  UDQUE collaborated with patients nurse and charge nurse throughout the day to provide appropriate support and encouragement to attend groups. Pt up on unit when DUQUE left last group of the day. All needs met.

## 2024-06-07 NOTE — GROUP NOTE
"Group Topic: Social Skills   Group Date: 6/7/2024  Start Time: 1600  End Time: 1650  Facilitators: MINA Negro   Department: [facility] REHAB THERAPY VIRTUAL    Number of Participants: 9   Group Focus: communication, healthy friendships, personal responsibility, self-awareness, and social skills  Treatment Modality: Other: Recreational Therapy  Interventions utilized were exploration, patient education, and support  Purpose: communication skills and other: living skills     Name: Ruthy Mcrae YOB: 1995   MR: [id number]      Facilitator: Recreational Therapist  Level of Participation: active  Quality of Participation: appropriate/pleasant, cooperative, and engaged  Interactions with others: appropriate  Mood/Affect: appropriate and positive  Triggers (if applicable): n/a  Cognition: coherent/clear  Progress: Moderate  Comments: In this Living Skills group we discussed the importance of Positive Communication Skills. We watched several segments from the Personal Growth DVD and had discussion in between. Patients were also provided with a handout which included several helpful tips/information including (tips for positive communication, healthy steps to conflict resolution, building and maintaining relationships).    Pt was calm, cooperative, and engaged in discussion. She asked appropriate questions and shared openly about past relationship struggles/communication breakdowns. She also shared about the DBT skill \"urge surfing\" which she has recently learned about and found helpful when dealing with intense emotions.     Plan: continue with services      "

## 2024-06-07 NOTE — CARE PLAN
Discussed in Treatment team today;  Attending wants to make sure patient's sleep is consistent on her new medication regimen and wants to monitor this over the weekend.  Plan is for discharge Monday, 06/10;  follow up with [facility] and [facility] as noted in After Visit summary;  Pt will go back home to stay with her parents at their home in [place] at discharge.  Sw to follow.

## 2024-06-07 NOTE — PROGRESS NOTES
"Ruthy Mcrae is a 28 y.o. female on day 13 of admission presenting with Major depressive disorder, recurrent (CMS-HCC).      Subjective   The patient was seen and examined. I reviewed the chart and vital signs from overnight. I reviewed previous notes. I reviewed medications, administered overnight and their reported benefits or side effects. I met with the patient in the hallway chairs. Patient stated she is feeling her anxiety is still present in small waves, sleep woke up twice. Reporting upper lip still tense but mildy so.    Last Recorded Vitals  Blood pressure 91/62, pulse 80, temperature 36.8 °C (98.2 °F), resp. rate 16, height 1.778 m (5' 10\"), weight 75.4 kg (166 lb 3.6 oz), SpO2 96%.    Review of Systems    Physical Exam  Mental Status Exam:   General: CF with ocd, anxiety, depression   Appearance: Appears stated age, in own attire fair grooming and hygiene  Attitude: cooperative, calm, engaged in her care   Behavior: tearful, help seeking, doing the work, addressing thoughts   Motor Activity: +eps  upper lip feels lightly tense, no td. Normal gait/station. normal muscle tone/bulk.  Speech: Normal rate, volume, tone, spontaneous, fluent.  Mood: depression 5 and anxiety 5  Affect: reactive, anxious  Thought Process: Organized, linear, goal directed.  Thought Content: denied auditory and visual hallucinations, elicted ocd level thinking boardering on fixed delusions, ruminative-active  Thought Perception: denies hallucinations. does not appear to be responding to hallucinatory stimuli  Cognition: Alert, oriented x3. Adequate fund of knowledge. Grossly intact.   Insight: fair; as patient recognizes symptoms of illness and need for recommended treatments.  Judgment: fair; actively seeking help.     IMPRESSION:  SI  COLLIN  Major Depressive disorder, severe with paranoid delusions  OCD     Plan:    Monitor sleep, anxiety  Luvox 25mg bid  Zyprexa 5mg at bedtime  Milieu therapy    5/30: luvox decreases enzymatic " breakdown of Zyprexa increasing latter drug levels. Reduce Zyprexa to 2.5mg at bedtime and increase luvox to 50mg am and 25mg at bedtime today,  with goal to stop Zyprexa prior to discharge if possible   5/31: stop luvox, start Cymbalta 30mg in am, stop Zyprexa start Abilify 2mg not and 5mg in am  Start Remeron 15mg at bedtime for sleep  6/3/24: started abilify now at 7.5mg am no eps. Remeron 15mg for sleep. Monitoring decrease in depression and anxiety values. High anxiety noted, crying. Will continue to monitor.  6/4/24:  Inc abilify to 10mg am, reported noted decrease in sleep, on starting abilify. No eps but will add cogentin op on dc for prn akathesia  Dc remeron due to risk of weight gain.   Retrial trazodone 100mg at bedtime  6/5: reduc abilify to 7.5mg am, start cogentin 0.5mg bid in am, 1mg tonight, inc trazodone 100mg to 200mg at bedtime  6/6: monitor for sleep not to break through trazodone over weekend, as it increased cycling, inc ruminations and depressive relapse.  6/7: inc trazodone to 250mg at bedtime and inc am cogentin 0.5mg to .75mg am    Elos < 4 days    I spent 22 minutes in the professional and overall care of this patient.      Brittani Bernabe MD

## 2024-06-07 NOTE — GROUP NOTE
Group Topic: Spiritual/Devotional/Thought of the Day   Group Date: 6/7/2024  Start Time: 0730  End Time: 0800  Facilitators: MINA Negro   Department: [facility] REHAB THERAPY VIRTUAL    Number of Participants: 9   Group Focus: goals, personal responsibility, and social skills  Treatment Modality: Other: Recreational Therapy   Interventions utilized were exploration, patient education, and support  Purpose: maladaptive thinking, insight or knowledge, self-care, and other: decision making     Name: Ruthy Mcrae YOB: 1995   MR: [id number]      Facilitator: Recreational Therapist  Level of Participation: active  Quality of Participation: appropriate/pleasant, cooperative, and engaged  Interactions with others: appropriate  Mood/Affect: appropriate and positive  Triggers (if applicable): n/a  Cognition: coherent/clear  Progress: Moderate  Comments: Patients were provided with the Thought of the Day reading - “Over time, doing the right thing will make me the person I want to be.” Patients were given an opportunity to share their thoughts and encouraged to create a personal goal based on the reading.      Pt was pleasant and engaged this morning in group ice breaker and overall discussion about decision making. She asked questions relating to ADHD, wondering if she may have some of the symptoms. Seeking advice on how to discuss topic with her provider.     Plan: continue with services

## 2024-06-08 PROCEDURE — 2500000001 HC RX 250 WO HCPCS SELF ADMINISTERED DRUGS (ALT 637 FOR MEDICARE OP): Performed by: NURSE PRACTITIONER

## 2024-06-08 PROCEDURE — 2500000002 HC RX 250 W HCPCS SELF ADMINISTERED DRUGS (ALT 637 FOR MEDICARE OP, ALT 636 FOR OP/ED): Performed by: PSYCHIATRY & NEUROLOGY

## 2024-06-08 PROCEDURE — 99232 SBSQ HOSP IP/OBS MODERATE 35: CPT | Performed by: PSYCHIATRY & NEUROLOGY

## 2024-06-08 PROCEDURE — 1240000001 HC SEMI-PRIVATE BH ROOM DAILY

## 2024-06-08 PROCEDURE — 2500000001 HC RX 250 WO HCPCS SELF ADMINISTERED DRUGS (ALT 637 FOR MEDICARE OP): Performed by: PSYCHIATRY & NEUROLOGY

## 2024-06-08 RX ORDER — TRAZODONE HYDROCHLORIDE 100 MG/1
300 TABLET ORAL NIGHTLY
Status: DISCONTINUED | OUTPATIENT
Start: 2024-06-08 | End: 2024-06-10 | Stop reason: HOSPADM

## 2024-06-08 RX ORDER — HYDROXYZINE PAMOATE 25 MG/1
25 CAPSULE ORAL 3 TIMES DAILY
Status: DISCONTINUED | OUTPATIENT
Start: 2024-06-08 | End: 2024-06-08

## 2024-06-08 RX ADMIN — TRAZODONE HYDROCHLORIDE 300 MG: 100 TABLET ORAL at 20:19

## 2024-06-08 RX ADMIN — NORETHINDRONE ACETATE AND ETHINYL ESTRADIOL 1 TABLET: KIT at 08:33

## 2024-06-08 RX ADMIN — BACITRACIN 1 APPLICATION: 500 OINTMENT TOPICAL at 15:24

## 2024-06-08 RX ADMIN — HYDROXYZINE PAMOATE 50 MG: 50 CAPSULE ORAL at 20:19

## 2024-06-08 RX ADMIN — BACITRACIN 1 APPLICATION: 500 OINTMENT TOPICAL at 08:33

## 2024-06-08 RX ADMIN — DULOXETINE HYDROCHLORIDE 60 MG: 60 CAPSULE, DELAYED RELEASE ORAL at 08:33

## 2024-06-08 RX ADMIN — BENZTROPINE MESYLATE 0.75 MG: 0.5 TABLET ORAL at 08:32

## 2024-06-08 RX ADMIN — BACITRACIN 1 APPLICATION: 500 OINTMENT TOPICAL at 20:19

## 2024-06-08 RX ADMIN — ARIPIPRAZOLE 7.5 MG: 5 TABLET ORAL at 06:19

## 2024-06-08 RX ADMIN — HYDROXYZINE PAMOATE 25 MG: 25 CAPSULE ORAL at 09:22

## 2024-06-08 ASSESSMENT — PAIN SCALES - GENERAL
PAINLEVEL_OUTOF10: 0 - NO PAIN
PAINLEVEL_OUTOF10: 0 - NO PAIN

## 2024-06-08 ASSESSMENT — PAIN - FUNCTIONAL ASSESSMENT: PAIN_FUNCTIONAL_ASSESSMENT: 0-10

## 2024-06-08 NOTE — GROUP NOTE
Group Topic: Community   Group Date: 6/8/2024  Start Time: 1400  End Time: 1510  Facilitators: DIANDRA ElenaS   Department: [facility] REHAB THERAPY VIRTUAL    Number of Participants: 10   Group Focus: community group, social/communication, leisure activity  Treatment Modality: Recreation Therapy  Interventions utilized were: Community Meeting, Ice Cream Social, Catch Phrase, orientation and support  Purpose: Social Stimulation, Community Meeting Support, Leisure Awareness    Name: Ruthy Mcrae YOB: 1995   MR: [id number]      Facilitator: Recreational Therapist  Level of Participation: active  Quality of Participation: appropriate/pleasant, attentive, cooperative, and initiates communication  Interactions with others: appropriate and offered helpful suggestions  Mood/Affect: appropriate  Triggers (if applicable): N/A  Cognition: coherent/clear and logical  Progress: Moderate  Comments: During this time patients were provided with ice-cream and a leisure activity in addition to community meeting. Participants were provided an opportunity to understand unit guidelines, rules, expectations, and provide a healthy environment to give suggestions for unit improvements. Community meeting questionnaire slips were passed out and collect. CTRS prioritized suggestions to discuss during this session.  The group also included peers verbalizing suggestions while CTRS recorded meeting minutes.    Patient attended the entire session and completed all group tasks. She appeared to enjoy the ice cream and social stimulation. Patient provided appropriate suggestions while discussing the unit. During the leisure activity she showed excitement and was an active participant. After the session concluded she was provided time to play the guitar in the dining area with supervision.      Plan: continue with services

## 2024-06-08 NOTE — GROUP NOTE
Group Topic: Chemical Dependency - Dual Diagnosis   Group Date: 6/8/2024  Start Time: 1110  End Time: 1210  Facilitators: DIANDRA ElenaS   Department: [facility] REHAB THERAPY VIRTUAL    Number of Participants: 11   Group Focus: chemical dependency issues, coping skills, and dual diagnosis  Treatment Modality: Recreation Therapy  Interventions utilized were: Burning Bridges and Building New Ones, Alternate Rebellion and Adaptive Denial, exploration, patient education, and support  Purpose: coping skills, maladaptive thinking, insight or knowledge, relapse prevention strategies, and trigger / craving management    Name: Ruthy Mcrae YOB: 1995   MR: [id number]      Facilitator: Recreational Therapist  Level of Participation: active  Quality of Participation: appropriate/pleasant, attentive, and cooperative  Interactions with others: appropriate and supportive  Mood/Affect: appropriate  Triggers (if applicable): N/A  Cognition: coherent/clear and logical  Progress: Moderate  Comments: The session involved ways to eliminate the connections we have built with our addictive behaviors and how to start adopting new different (healthier) ones.  We spent time focusing on the creation of new visual and olfactory concepts. We also talked about other healthier ways to rebel against whatever may be influencing ones addictive behaviors. The process of adapting ones wants for problematic behaviors into something alternative was also covered.    Patient attended the entire session and completed all group tasks. She discussed the topics well and provided support to peers.     Plan: continue with services

## 2024-06-08 NOTE — CARE PLAN
"  Problem: Risk for Suicide  Goal: Accepts medications as prescribed/needed this shift  Outcome: Progressing     Problem: Fall/Injury  Goal: Be free from injury by end of the shift  Outcome: Progressing     Problem: Sensory Perceptual Alteration as Evidenced by  Goal: Participates in unit activities  Outcome: Progressing   The patient's goals for the shift include \"Get a good night rest.\"    The clinical goals for the shift include maintain safety    Over the shift, the patient did not make progress toward the following goals. The pt had an uneventful night, sleeping throughout the night. Q 15 minute monitoring continued.     "

## 2024-06-08 NOTE — NURSING NOTE
"The pt was calm and cooperative [facility]. The pt rated her anxiety and depression both 4/10, denies SI, HI and AVH at this time as well as pain rating it a 0/10. Last bowel movement was, \"yesterday\" 6/6/24. Coping skills, \"Journaling.\" Goal for the night, \"Get a good nights rest.\" Pt strength, \"I'm bright.\" The pt was medication compliant with her evening medications. Q 15 minute monitoring continued.   "

## 2024-06-08 NOTE — GROUP NOTE
"Group Topic: Coping Skills   Group Date: 6/8/2024  Start Time: 0930  End Time: 1030  Facilitators: MINA Elena   Department: [facility] REHAB THERAPY VIRTUAL    Number of Participants: 9   Group Focus: check in, coping skills, and leisure skills  Treatment Modality: Recreation Therapy  Interventions utilized were: My Personal Coping Skills, A to Z Coping List  exploration, story telling, and support  Purpose: coping skills, insight or knowledge, and self-care    Name: Ruthy Mcrae YOB: 1995   MR: [id number]      Facilitator: Recreational Therapist  Level of Participation: active  Quality of Participation: appropriate/pleasant, attentive, cooperative, and initiates communication  Interactions with others: appropriate, supportive, and offered helpful suggestions  Mood/Affect: appropriate and positive  Triggers (if applicable): N/A  Cognition: coherent/clear, logical, and capable  Progress: Moderate  Comments: The session included describing coping skills in categories (distraction, grounding, emotional release, self-love, and thought challenge).  Patients were provided a handout in which they could identify skills they use (by writing or drawing).  Participants were also provided a handout that included example coping strategies using each letter of the alphabet.    Patient attended the entire session and completed all group tasks. She was willing to discuss personal coping strategies and comment on topics being being talked about. Patient identified \"journaling\" and using \"CBT thought substitution/challenging\" to manage symptoms.     Plan: continue with services      "

## 2024-06-08 NOTE — CARE PLAN
"The patient's goals for the shift include \"to attend all the groups\"    The clinical goals for the shift include treatment compliant    Over the shift, the patient did make progress toward the following goals. Barriers to progression include acknowledgement of illness. Recommendations to address these barriers include treatment plan.    "

## 2024-06-08 NOTE — PROGRESS NOTES
"Ruthy Mcrae is a 28 y.o. female on day 14 of admission presenting with Major depressive disorder, recurrent (CMS-HCC).      Subjective   The patient was seen and examined. I reviewed the chart and vital signs from overnight. I reviewed previous notes. I reviewed medications, administered overnight and their reported benefits or side effects. I met with the patient in the hallway chairs. Patient stated she is feeling her anxiety breaking through in small notable peaks. Sleep, woke up few times.     Last Recorded Vitals  Blood pressure 87/60, pulse 92, temperature 36.4 °C (97.5 °F), temperature source Temporal, resp. rate 16, height 1.778 m (5' 10\"), weight 75.4 kg (166 lb 3.6 oz), SpO2 97%.    Review of Systems    Physical Exam  Mental Status Exam:   General: CF with ocd, anxiety, depression   Appearance: Appears stated age, in own attire fair grooming and hygiene  Attitude: cooperative, calm, engaged in her care   Behavior: tearful, help seeking, doing the work, addressing thoughts   Motor Activity: +eps  upper lip feels lightly tense, no td. Normal gait/station. normal muscle tone/bulk.  Speech: Normal rate, volume, tone, spontaneous, fluent.  Mood: depression 4 and anxiety 5  Affect: reactive, anxious  Thought Process: Organized, linear, goal directed.  Thought Content: denied auditory and visual hallucinations, elicted ocd level thinking boardering on fixed delusions, ruminative-active  Thought Perception: denies hallucinations. does not appear to be responding to hallucinatory stimuli  Cognition: Alert, oriented x3. Adequate fund of knowledge. Grossly intact.   Insight: fair; as patient recognizes symptoms of illness and need for recommended treatments.  Judgment: fair; actively seeking help.     IMPRESSION:  SI  COLLIN  Major Depressive disorder, severe with paranoid delusions  OCD     Plan:    Monitor sleep, anxiety  Luvox 25mg bid  Zyprexa 5mg at bedtime  Milieu therapy    5/30: luvox decreases enzymatic " breakdown of Zyprexa increasing latter drug levels. Reduce Zyprexa to 2.5mg at bedtime and increase luvox to 50mg am and 25mg at bedtime today,  with goal to stop Zyprexa prior to discharge if possible   5/31: stop luvox, start Cymbalta 30mg in am, stop Zyprexa start Abilify 2mg not and 5mg in am  Start Remeron 15mg at bedtime for sleep  6/3/24: started abilify now at 7.5mg am no eps. Remeron 15mg for sleep. Monitoring decrease in depression and anxiety values. High anxiety noted, crying. Will continue to monitor.  6/4/24:  Inc abilify to 10mg am, reported noted decrease in sleep, on starting abilify. No eps but will add cogentin op on dc for prn akathesia  Dc remeron due to risk of weight gain.   Retrial trazodone 100mg at bedtime  6/5: reduc abilify to 7.5mg am, start cogentin 0.5mg bid in am, 1mg tonight, inc trazodone 100mg to 200mg at bedtime  6/6: monitor for sleep not to break through trazodone over weekend, as it increased cycling, inc ruminations and depressive relapse.  6/7: inc trazodone to 250mg at bedtime and inc am cogentin 0.5mg to .75mg am  6/8: inc trazodone to 300mg at bedtime    Elos < 4 days    I spent 22 minutes in the professional and overall care of this patient.      Brittani Bernabe MD

## 2024-06-08 NOTE — NURSING NOTE
Pt was up on unit social with peers and engaging with staff. Pt is attending all groups. Pleasant and cooperative Pt is looking forward for discharge and states that she has a very good support systems with her family . Anxiety 5/10 depression 4/10. Pt denied SI/HI and A/V/H. Safety maintained. Med compliant. Pt contracted for safety with this staff at this time.

## 2024-06-09 ENCOUNTER — APPOINTMENT (OUTPATIENT)
Dept: CARDIOLOGY | Facility: HOSPITAL | Age: 29
DRG: 885 | End: 2024-06-09
Payer: COMMERCIAL

## 2024-06-09 PROCEDURE — 2500000001 HC RX 250 WO HCPCS SELF ADMINISTERED DRUGS (ALT 637 FOR MEDICARE OP): Performed by: NURSE PRACTITIONER

## 2024-06-09 PROCEDURE — 2500000002 HC RX 250 W HCPCS SELF ADMINISTERED DRUGS (ALT 637 FOR MEDICARE OP, ALT 636 FOR OP/ED): Performed by: PSYCHIATRY & NEUROLOGY

## 2024-06-09 PROCEDURE — 99232 SBSQ HOSP IP/OBS MODERATE 35: CPT | Performed by: PSYCHIATRY & NEUROLOGY

## 2024-06-09 PROCEDURE — 1240000001 HC SEMI-PRIVATE BH ROOM DAILY

## 2024-06-09 PROCEDURE — 93005 ELECTROCARDIOGRAM TRACING: CPT

## 2024-06-09 PROCEDURE — 2500000001 HC RX 250 WO HCPCS SELF ADMINISTERED DRUGS (ALT 637 FOR MEDICARE OP): Performed by: PSYCHIATRY & NEUROLOGY

## 2024-06-09 PROCEDURE — 99231 SBSQ HOSP IP/OBS SF/LOW 25: CPT | Performed by: INTERNAL MEDICINE

## 2024-06-09 RX ORDER — ALPRAZOLAM 0.5 MG/1
1 TABLET ORAL NIGHTLY
Status: DISCONTINUED | OUTPATIENT
Start: 2024-06-09 | End: 2024-06-10 | Stop reason: HOSPADM

## 2024-06-09 RX ADMIN — BENZTROPINE MESYLATE 0.75 MG: 0.5 TABLET ORAL at 08:00

## 2024-06-09 RX ADMIN — ALPRAZOLAM 1 MG: 0.5 TABLET ORAL at 20:39

## 2024-06-09 RX ADMIN — ARIPIPRAZOLE 7.5 MG: 5 TABLET ORAL at 06:08

## 2024-06-09 RX ADMIN — NORETHINDRONE ACETATE AND ETHINYL ESTRADIOL 1 TABLET: KIT at 08:01

## 2024-06-09 RX ADMIN — BACITRACIN 1 APPLICATION: 500 OINTMENT TOPICAL at 08:00

## 2024-06-09 RX ADMIN — HYDROXYZINE PAMOATE 50 MG: 50 CAPSULE ORAL at 08:00

## 2024-06-09 RX ADMIN — BACITRACIN 1 APPLICATION: 500 OINTMENT TOPICAL at 15:04

## 2024-06-09 RX ADMIN — TRAZODONE HYDROCHLORIDE 300 MG: 100 TABLET ORAL at 20:39

## 2024-06-09 RX ADMIN — BACITRACIN 1 APPLICATION: 500 OINTMENT TOPICAL at 20:39

## 2024-06-09 RX ADMIN — DULOXETINE HYDROCHLORIDE 60 MG: 60 CAPSULE, DELAYED RELEASE ORAL at 08:01

## 2024-06-09 ASSESSMENT — PAIN SCALES - GENERAL
PAINLEVEL_OUTOF10: 0 - NO PAIN
PAINLEVEL_OUTOF10: 0 - NO PAIN

## 2024-06-09 ASSESSMENT — PAIN - FUNCTIONAL ASSESSMENT: PAIN_FUNCTIONAL_ASSESSMENT: 0-10

## 2024-06-09 NOTE — NURSING NOTE
"Pt was bright and cooperative during assessment. Pt rated anxiety 4/10, depression 3/10. Pt denied all SI/HI, AVH, and pain at this time. Pt stated her goal is \"keep journaling,\" and her coping skill is \"journaling.\" Pt stated her two strengths are \"play piano, and good communicator.\" Pt took all scheduled medications without any issues, PRN vistaril given for anxiety. Pt is currently sleeping in bed without any obvious signs or symptoms of distress. No new orders to carry out at this time. Q15 minute safety checks maintained.   "

## 2024-06-09 NOTE — NURSING NOTE
Pt had an uneventful night, no PRNS needed. Pt is currently sleeping in bed without any obvious signs or symptoms of distress. No new orders to carry out at this time. Q15 minute safety checks maintained

## 2024-06-09 NOTE — CARE PLAN
"The patient's goals for the shift include \"spend as much time out of my room\"    The clinical goals for the shift include treatment compliant    Over the shift, the patient did not make progress toward the following goals. Barriers to progression include acknowledgement of illness. Recommendations to address these barriers include treatment plan .    "

## 2024-06-09 NOTE — PROGRESS NOTES
"Ruthy Mcrae is a 28 y.o. female on day 15 of admission presenting with Major depressive disorder, recurrent (CMS-HCC).      Subjective   The patient was seen and examined. I reviewed the chart and vital signs from overnight. I reviewed previous notes. I reviewed medications, administered overnight and their reported benefits or side effects. I met with the patient in the hallway chairs. Patient stated she is feeling her anxiety breaking through in small notable peaks. Sleep, woke up few times continues. Mild figer tremor she feels is from low sugar post sugary breakfast, not eating enough.     Last Recorded Vitals  Blood pressure 96/66, pulse 110, temperature 36.6 °C (97.9 °F), resp. rate 16, height 1.778 m (5' 10\"), weight 73.5 kg (162 lb 0.6 oz), SpO2 98%.    Review of Systems    Physical Exam  Mental Status Exam:   General: CF with ocd, anxiety, depression   Appearance: Appears stated age, in own attire fair grooming and hygiene  Attitude: cooperative, calm, engaged in her care   Behavior: tearful, help seeking, doing the work, addressing thoughts   Motor Activity: +eps  upper lip feels lightly tense, no td. Normal gait/station. normal muscle tone/bulk.  Speech: Normal rate, volume, tone, spontaneous, fluent.  Mood: depression 3 and anxiety 4  Affect: reactive, anxious  Thought Process: Organized, linear, goal directed.  Thought Content: denied auditory and visual hallucinations, elicted ocd level thinking boardering on fixed delusions, ruminative-active  Thought Perception: denies hallucinations. does not appear to be responding to hallucinatory stimuli  Cognition: Alert, oriented x3. Adequate fund of knowledge. Grossly intact.   Insight: fair; as patient recognizes symptoms of illness and need for recommended treatments.  Judgment: fair; actively seeking help.     IMPRESSION:  SI  COLLIN  Major Depressive disorder, severe with paranoid delusions  OCD     Plan:    Monitor sleep, anxiety  Luvox 25mg bid  Zyprexa " 5mg at bedtime  Milieu therapy    5/30: luvox decreases enzymatic breakdown of Zyprexa increasing latter drug levels. Reduce Zyprexa to 2.5mg at bedtime and increase luvox to 50mg am and 25mg at bedtime today,  with goal to stop Zyprexa prior to discharge if possible   5/31: stop luvox, start Cymbalta 30mg in am, stop Zyprexa start Abilify 2mg not and 5mg in am  Start Remeron 15mg at bedtime for sleep  6/3/24: started abilify now at 7.5mg am no eps. Remeron 15mg for sleep. Monitoring decrease in depression and anxiety values. High anxiety noted, crying. Will continue to monitor.  6/4/24:  Inc abilify to 10mg am, reported noted decrease in sleep, on starting abilify. No eps but will add cogentin op on dc for prn akathesia  Dc remeron due to risk of weight gain.   Retrial trazodone 100mg at bedtime  6/5: reduc abilify to 7.5mg am, start cogentin 0.5mg bid in am, 1mg tonight, inc trazodone 100mg to 200mg at bedtime  6/6: monitor for sleep not to break through trazodone over weekend, as it increased cycling, inc ruminations and depressive relapse.  6/7: inc trazodone to 250mg at bedtime and inc am cogentin 0.5mg to .75mg am  6/8: inc trazodone to 300mg at bedtime  6/9: schedule xanax 1mg at bedtime  Dc in am if stable overnight    I spent 22 minutes in the professional and overall care of this patient.      Brittani Bernabe MD

## 2024-06-09 NOTE — CARE PLAN
"The patient's goals for the shift include \"keep journaling.\"    The clinical goals for the shift include maintain safety    Problem: Risk for Suicide  Goal: Accepts medications as prescribed/needed this shift  Outcome: Progressing  Goal: Identifies supports this shift  Outcome: Progressing  Goal: Makes needs known through verbalization or behaviors this shift  Outcome: Progressing  Goal: No self harm this shift  Outcome: Progressing  Goal: Read Safety Guidelines this shift  Outcome: Progressing  Goal: Complete Mental Health Safety Plan (psychiatry only) this shift  Outcome: Progressing     Problem: Fall/Injury  Goal: Not fall by end of shift  Outcome: Progressing  Goal: Be free from injury by end of the shift  Outcome: Progressing  Goal: Verbalize understanding of personal risk factors for fall in the hospital  Outcome: Progressing  Goal: Verbalize understanding of risk factor reduction measures to prevent injury from fall in the home  Outcome: Progressing  Goal: Use assistive devices by end of the shift  Outcome: Progressing  Goal: Pace activities to prevent fatigue by end of the shift  Outcome: Progressing     Problem: Sensory Perceptual Alteration as Evidenced by  Goal: Cooperates with admission process  Outcome: Progressing  Goal: Patient/Family participate in treatment and discharge plans  Outcome: Progressing  Goal: Patient/Family verbalizes awareness of resources  Outcome: Progressing  Goal: Participates in unit activities  Outcome: Progressing  Goal: Discusses signs/symptoms of illness/treatment options  Outcome: Progressing  Goal: Initiates reality-based interactions  Outcome: Progressing  Goal: Able to discuss content of hallucinations/delusions  Outcome: Progressing  Goal: Notifies staff when experiencing hallucinations/delusions  Outcome: Progressing  Goal: Verbalizes reduction in hallucinations/delusions  Outcome: Progressing  Goal: Will not act on psychotic perception  Outcome: Progressing  Goal: " Understands least restrictive measures  Outcome: Progressing  Goal: Free from restraint events  Outcome: Progressing     Problem: Altered Thought Processes as Evidenced by  Goal: STG - Desires improvement in ability to think and concentrate  Outcome: Progressing  Goal: STG - Participates in Occupational Therapy and other cognitive assessments  Outcome: Progressing     Problem: Potential for Harm to Self or Others  Goal: Cooperates with admission process  Outcome: Progressing  Goal: Participates in unit activities  Outcome: Progressing  Goal: Patient/Family participate in treatment and discharge plans  Outcome: Progressing  Goal: Identifies deescalation techniques  Outcome: Progressing  Goal: Understands least restrictive measures  Outcome: Progressing  Goal: Identifies stressors that lead to harmful behaviors  Outcome: Progressing  Goal: Notifies staff when experiencing harmful thoughts toward self/others  Outcome: Progressing  Goal: Denies harm toward self or others  Outcome: Progressing  Goal: Free from restraint events  Outcome: Progressing     Problem: Educational/Scholastic Disruption  Goal: Meets educational requirements during hospitalization  Outcome: Progressing  Goal: Attends class without disruptive behavior  Outcome: Progressing  Goal: Completes daily assignments  Outcome: Progressing     Problem: Ineffective Coping  Goal: Cooperates with admission process  Outcome: Progressing  Goal: Identifies ineffective coping skills  Outcome: Progressing  Goal: Identifies healthy coping skills  Outcome: Progressing  Goal: Demonstrates healthy coping skills  Outcome: Progressing  Goal: Participates in unit activities  Outcome: Progressing  Goal: Patient/Family participate in treatment and discharge plans  Outcome: Progressing  Goal: Patient/Family verbalizes awareness of resources  Outcome: Progressing  Goal: Understands least restrictive measures  Outcome: Progressing  Goal: Free from restraint events  Outcome:  Progressing     Problem: Alteration in Sleep  Goal: STG - Reports nightly sleep, duration, and quality  Outcome: Progressing  Goal: STG - Identifies sleep hygiene aids  Outcome: Progressing  Goal: STG - Informs staff if unable to sleep  Outcome: Progressing  Goal: STG - Attends breathing and relaxation group  Outcome: Progressing     Problem: Potential for Substance Withdrawal  Goal: Verbalizes signs/symptoms of withdrawal  Outcome: Progressing  Goal: Reports signs/symptoms of withdrawal  Outcome: Progressing  Goal: Free of withdrawal symptoms  Outcome: Progressing     Problem: Anxiety  Goal: Patient/family understands admission protocols  Outcome: Progressing  Goal: Attempts to manage anxiety with help  Outcome: Progressing  Goal: Verbalizes ways to manage anxiety  Outcome: Progressing  Goal: Implements measures to reduce anxiety  Outcome: Progressing  Goal: Free from restraint events  Outcome: Progressing     Problem: Self Care Deficit  Goal: STG - Patient completes hygiene  Outcome: Progressing  Goal: Increase group attendance  Outcome: Progressing  Goal: Accepts need for medications  Outcome: Progressing  Goal: STG - Goes to and eats meals independently in dining room 100% of time  Outcome: Progressing     Problem: Defensive Coping  Goal: Cooperates with admission process  Outcome: Progressing  Goal: Identifies reckless/dangerous behavior  Outcome: Progressing  Goal: Identifies stressors that lead to reckless/dangerous behavior  Outcome: Progressing  Goal: Discusses and identifies healthy coping skills  Outcome: Progressing  Goal: Demonstrates healthy coping skills  Outcome: Progressing  Goal: Identifies appropriate social interaction  Outcome: Progressing  Goal: Demonstrates appropriate social interactions  Outcome: Progressing  Goal: Patient/Family verbalizes awareness of resources  Outcome: Progressing  Goal: Discusses signs/symptoms of illness/treatment options  Outcome: Progressing  Goal: Patient/Family  participate in treatment and discharge plans  Outcome: Progressing  Goal: Understands least restrictive measures  Outcome: Progressing  Goal: Free from restraint events  Outcome: Progressing     Over the shift, the patient did make progress toward the following goals.

## 2024-06-09 NOTE — NURSING NOTE
"Pt was up on unit social with peers , pleasant and cooperative during assessment , pt seems to perseverate on certain thing stated she did not sleep well last night it was documented that she slept 7UB Hours. Anxiety 4/10 depression 3/10. \"I might have some anxiety about being discharged\" Pt denied SI/HI and A/V/H. Safety maintained. Med compliant. Pt contracted for safety with this staff at this time.   "

## 2024-06-10 VITALS
RESPIRATION RATE: 16 BRPM | OXYGEN SATURATION: 97 % | SYSTOLIC BLOOD PRESSURE: 105 MMHG | WEIGHT: 162.04 LBS | DIASTOLIC BLOOD PRESSURE: 69 MMHG | HEIGHT: 70 IN | HEART RATE: 100 BPM | TEMPERATURE: 97.9 F | BODY MASS INDEX: 23.2 KG/M2

## 2024-06-10 LAB
ANION GAP SERPL CALC-SCNC: 12 MMOL/L (ref 10–20)
BUN SERPL-MCNC: 11 MG/DL (ref 6–23)
CALCIUM SERPL-MCNC: 8.9 MG/DL (ref 8.6–10.3)
CHLORIDE SERPL-SCNC: 106 MMOL/L (ref 98–107)
CO2 SERPL-SCNC: 26 MMOL/L (ref 21–32)
CREAT SERPL-MCNC: 0.87 MG/DL (ref 0.5–1.05)
EGFRCR SERPLBLD CKD-EPI 2021: >90 ML/MIN/1.73M*2
ERYTHROCYTE [DISTWIDTH] IN BLOOD BY AUTOMATED COUNT: 12.2 % (ref 11.5–14.5)
GLUCOSE SERPL-MCNC: 82 MG/DL (ref 74–99)
HCT VFR BLD AUTO: 39 % (ref 36–46)
HGB BLD-MCNC: 13 G/DL (ref 12–16)
MCH RBC QN AUTO: 30.4 PG (ref 26–34)
MCHC RBC AUTO-ENTMCNC: 33.3 G/DL (ref 32–36)
MCV RBC AUTO: 91 FL (ref 80–100)
NRBC BLD-RTO: 0 /100 WBCS (ref 0–0)
PLATELET # BLD AUTO: 213 X10*3/UL (ref 150–450)
POTASSIUM SERPL-SCNC: 3.6 MMOL/L (ref 3.5–5.3)
RBC # BLD AUTO: 4.28 X10*6/UL (ref 4–5.2)
SODIUM SERPL-SCNC: 140 MMOL/L (ref 136–145)
TSH SERPL-ACNC: 1.6 MIU/L (ref 0.44–3.98)
WBC # BLD AUTO: 4.7 X10*3/UL (ref 4.4–11.3)

## 2024-06-10 PROCEDURE — 80048 BASIC METABOLIC PNL TOTAL CA: CPT | Performed by: INTERNAL MEDICINE

## 2024-06-10 PROCEDURE — 84443 ASSAY THYROID STIM HORMONE: CPT | Performed by: INTERNAL MEDICINE

## 2024-06-10 PROCEDURE — 2500000002 HC RX 250 W HCPCS SELF ADMINISTERED DRUGS (ALT 637 FOR MEDICARE OP, ALT 636 FOR OP/ED): Performed by: PSYCHIATRY & NEUROLOGY

## 2024-06-10 PROCEDURE — 36415 COLL VENOUS BLD VENIPUNCTURE: CPT | Performed by: INTERNAL MEDICINE

## 2024-06-10 PROCEDURE — 99239 HOSP IP/OBS DSCHRG MGMT >30: CPT | Performed by: PSYCHIATRY & NEUROLOGY

## 2024-06-10 PROCEDURE — 2500000001 HC RX 250 WO HCPCS SELF ADMINISTERED DRUGS (ALT 637 FOR MEDICARE OP): Performed by: PSYCHIATRY & NEUROLOGY

## 2024-06-10 PROCEDURE — 85027 COMPLETE CBC AUTOMATED: CPT | Performed by: INTERNAL MEDICINE

## 2024-06-10 PROCEDURE — 97150 GROUP THERAPEUTIC PROCEDURES: CPT | Mod: GO,CO

## 2024-06-10 PROCEDURE — 2500000001 HC RX 250 WO HCPCS SELF ADMINISTERED DRUGS (ALT 637 FOR MEDICARE OP): Performed by: NURSE PRACTITIONER

## 2024-06-10 RX ORDER — TRAZODONE HYDROCHLORIDE 300 MG/1
300 TABLET ORAL NIGHTLY
Qty: 90 TABLET | Refills: 2 | Status: SHIPPED | OUTPATIENT
Start: 2024-06-10

## 2024-06-10 RX ORDER — BENZTROPINE MESYLATE 0.5 MG/1
0.75 TABLET ORAL DAILY
Qty: 45 TABLET | Refills: 2 | Status: SHIPPED | OUTPATIENT
Start: 2024-06-10 | End: 2024-09-08

## 2024-06-10 RX ORDER — ALPRAZOLAM 1 MG/1
1 TABLET ORAL NIGHTLY
Qty: 30 TABLET | Refills: 2 | Status: SHIPPED | OUTPATIENT
Start: 2024-06-10 | End: 2024-09-08

## 2024-06-10 RX ORDER — ARIPIPRAZOLE 15 MG/1
7.5 TABLET ORAL EVERY 24 HOURS
Qty: 15 TABLET | Refills: 3 | Status: SHIPPED | OUTPATIENT
Start: 2024-06-10 | End: 2024-10-08

## 2024-06-10 RX ORDER — DULOXETIN HYDROCHLORIDE 60 MG/1
60 CAPSULE, DELAYED RELEASE ORAL DAILY
Qty: 30 CAPSULE | Refills: 2 | Status: SHIPPED | OUTPATIENT
Start: 2024-06-10 | End: 2024-09-08

## 2024-06-10 RX ADMIN — DULOXETINE HYDROCHLORIDE 60 MG: 60 CAPSULE, DELAYED RELEASE ORAL at 09:20

## 2024-06-10 RX ADMIN — ARIPIPRAZOLE 7.5 MG: 5 TABLET ORAL at 06:26

## 2024-06-10 RX ADMIN — NORETHINDRONE ACETATE AND ETHINYL ESTRADIOL 1 TABLET: KIT at 09:19

## 2024-06-10 RX ADMIN — BENZTROPINE MESYLATE 0.75 MG: 0.5 TABLET ORAL at 09:20

## 2024-06-10 ASSESSMENT — PAIN - FUNCTIONAL ASSESSMENT: PAIN_FUNCTIONAL_ASSESSMENT: 0-10

## 2024-06-10 ASSESSMENT — PAIN SCALES - GENERAL: PAINLEVEL_OUTOF10: 0 - NO PAIN

## 2024-06-10 NOTE — SIGNIFICANT EVENT
"I was called to see patient for hypotension and tachycardia.  She was seen and examined.  The states that she feels a little lightheaded when she is standing up.  She states that she was recently started on Abilify and Cymbalta.  She is currently afebrile.  She reported no respiratory, GI or  symptoms.  Her blood pressure was found to be 107/71 with a heart rate of 114 while sitting down.  Her systolic blood pressure dropped to 93 and heart rate increased to 128 when she stood up.  This may be in part due to medication.  Patient also drinks 2 cups of coffee in the morning which may also contribute to the Tachycardia.  We will give a bolus of normal saline.  Repeat EKG.  Check TSH, CBC and BMP in the morning.  Follow-up with psych in the morning.  Will monitor.    /71 (Patient Position: Sitting)   Pulse (!) 114   Temp 36.1 °C (97 °F)   Resp 16   Ht 1.778 m (5' 10\")   Wt 73.5 kg (162 lb 0.6 oz)   SpO2 97%   BMI 23.25 kg/m²     "

## 2024-06-10 NOTE — NURSING NOTE
"Pt was assessed this morning in the hallway away from peers for privacy. She presents as pleasant, cooperative and engaged in conversation. Pt reports \" sleeping better last night\" but states she woke up early and thinks it's because she is going home today. Her anxiety is 5/10 and depression is 3/10 with no SI/HI or AVH reported. She identified coping skills as \" journaling and breathing\". Her strengths are identified as \" I'm a quick learner and a genuine person\". Pt has been observed out on the unit and social with peers. She has been both medication and group compliant. New order for discharge today. Discharge instructions and follow up appointments were reviewed with patient. Will continue to monitor for safety.  "

## 2024-06-10 NOTE — NURSING NOTE
"Pt was bright and cooperative during assessment. Pt rated anxiety 4/10, depression 4/10. Pt denied all SI/HI, AVH, and pain at this time. Pt stated her goal is \"fold origami\" and her coping skill is \"journaling.\" Pt stated her two strengths are \"origami cranes and good friend.\" Pt took all scheduled medications without any issues, no PRNs needed. Pt is currently sleeping in bed without any obvious signs or symptoms of distress. No new orders to carry out at this time. Q15 minute safety checks maintained.     Hospitalist consulted at 1930 for positive orthos. Provider came over to assess pt. EKG done. Vitals reassessed and more appropriate at this time. Will continue to monitor.                 "

## 2024-06-10 NOTE — CARE PLAN
The patient's goals for the shift include fold origami    The clinical goals for the shift include maintai nsafety    Problem: Risk for Suicide  Goal: Accepts medications as prescribed/needed this shift  Outcome: Progressing  Goal: Identifies supports this shift  Outcome: Progressing  Goal: Makes needs known through verbalization or behaviors this shift  Outcome: Progressing  Goal: No self harm this shift  Outcome: Progressing  Goal: Read Safety Guidelines this shift  Outcome: Progressing  Goal: Complete Mental Health Safety Plan (psychiatry only) this shift  Outcome: Progressing     Problem: Fall/Injury  Goal: Not fall by end of shift  Outcome: Progressing  Goal: Be free from injury by end of the shift  Outcome: Progressing  Goal: Verbalize understanding of personal risk factors for fall in the hospital  Outcome: Progressing  Goal: Verbalize understanding of risk factor reduction measures to prevent injury from fall in the home  Outcome: Progressing  Goal: Use assistive devices by end of the shift  Outcome: Progressing  Goal: Pace activities to prevent fatigue by end of the shift  Outcome: Progressing     Problem: Sensory Perceptual Alteration as Evidenced by  Goal: Cooperates with admission process  Outcome: Progressing  Goal: Patient/Family participate in treatment and discharge plans  Outcome: Progressing  Goal: Patient/Family verbalizes awareness of resources  Outcome: Progressing  Goal: Participates in unit activities  Outcome: Progressing  Goal: Discusses signs/symptoms of illness/treatment options  Outcome: Progressing  Goal: Initiates reality-based interactions  Outcome: Progressing  Goal: Able to discuss content of hallucinations/delusions  Outcome: Progressing  Goal: Notifies staff when experiencing hallucinations/delusions  Outcome: Progressing  Goal: Verbalizes reduction in hallucinations/delusions  Outcome: Progressing  Goal: Will not act on psychotic perception  Outcome: Progressing  Goal: Understands  least restrictive measures  Outcome: Progressing  Goal: Free from restraint events  Outcome: Progressing     Problem: Altered Thought Processes as Evidenced by  Goal: STG - Desires improvement in ability to think and concentrate  Outcome: Progressing  Goal: STG - Participates in Occupational Therapy and other cognitive assessments  Outcome: Progressing     Problem: Potential for Harm to Self or Others  Goal: Cooperates with admission process  Outcome: Progressing  Goal: Participates in unit activities  Outcome: Progressing  Goal: Patient/Family participate in treatment and discharge plans  Outcome: Progressing  Goal: Identifies deescalation techniques  Outcome: Progressing  Goal: Understands least restrictive measures  Outcome: Progressing  Goal: Identifies stressors that lead to harmful behaviors  Outcome: Progressing  Goal: Notifies staff when experiencing harmful thoughts toward self/others  Outcome: Progressing  Goal: Denies harm toward self or others  Outcome: Progressing  Goal: Free from restraint events  Outcome: Progressing     Problem: Educational/Scholastic Disruption  Goal: Meets educational requirements during hospitalization  Outcome: Progressing  Goal: Attends class without disruptive behavior  Outcome: Progressing  Goal: Completes daily assignments  Outcome: Progressing     Problem: Ineffective Coping  Goal: Cooperates with admission process  Outcome: Progressing  Goal: Identifies ineffective coping skills  Outcome: Progressing  Goal: Identifies healthy coping skills  Outcome: Progressing  Goal: Demonstrates healthy coping skills  Outcome: Progressing  Goal: Participates in unit activities  Outcome: Progressing  Goal: Patient/Family participate in treatment and discharge plans  Outcome: Progressing  Goal: Patient/Family verbalizes awareness of resources  Outcome: Progressing  Goal: Understands least restrictive measures  Outcome: Progressing  Goal: Free from restraint events  Outcome: Progressing      Problem: Alteration in Sleep  Goal: STG - Reports nightly sleep, duration, and quality  Outcome: Progressing  Goal: STG - Identifies sleep hygiene aids  Outcome: Progressing  Goal: STG - Informs staff if unable to sleep  Outcome: Progressing  Goal: STG - Attends breathing and relaxation group  Outcome: Progressing     Problem: Potential for Substance Withdrawal  Goal: Verbalizes signs/symptoms of withdrawal  Outcome: Progressing  Goal: Reports signs/symptoms of withdrawal  Outcome: Progressing  Goal: Free of withdrawal symptoms  Outcome: Progressing     Problem: Anxiety  Goal: Patient/family understands admission protocols  Outcome: Progressing  Goal: Attempts to manage anxiety with help  Outcome: Progressing  Goal: Verbalizes ways to manage anxiety  Outcome: Progressing  Goal: Implements measures to reduce anxiety  Outcome: Progressing  Goal: Free from restraint events  Outcome: Progressing     Problem: Self Care Deficit  Goal: STG - Patient completes hygiene  Outcome: Progressing  Goal: Increase group attendance  Outcome: Progressing  Goal: Accepts need for medications  Outcome: Progressing  Goal: STG - Goes to and eats meals independently in dining room 100% of time  Outcome: Progressing     Problem: Defensive Coping  Goal: Cooperates with admission process  Outcome: Progressing  Goal: Identifies reckless/dangerous behavior  Outcome: Progressing  Goal: Identifies stressors that lead to reckless/dangerous behavior  Outcome: Progressing  Goal: Discusses and identifies healthy coping skills  Outcome: Progressing  Goal: Demonstrates healthy coping skills  Outcome: Progressing  Goal: Identifies appropriate social interaction  Outcome: Progressing  Goal: Demonstrates appropriate social interactions  Outcome: Progressing  Goal: Patient/Family verbalizes awareness of resources  Outcome: Progressing  Goal: Discusses signs/symptoms of illness/treatment options  Outcome: Progressing  Goal: Patient/Family participate in  treatment and discharge plans  Outcome: Progressing  Goal: Understands least restrictive measures  Outcome: Progressing  Goal: Free from restraint events  Outcome: Progressing     Over the shift, the patient did make progress toward the following goals.

## 2024-06-10 NOTE — CARE PLAN
"The patient's goals for the shift include \" find some fun things to do when I get out\"    The clinical goals for the shift include maintain safety    Over the shift, the patient did not make progress toward the following goals. Barriers to progression include acute illness. Recommendations to address these barriers include continue medications as ordered.    "

## 2024-06-10 NOTE — PROGRESS NOTES
"Occupational Therapy     REHAB Therapy Assessment & Treatment    Patient Name: Ruthy Mcrae  MRN: [id number]  Today's Date: 6/10/2024      Activity Assessment:   Happiness and Recovery Group: [id number]  Who Are you and Self Understanding Group: [id number]  SUZANNE and Positive Supports Group: [id number]    3/3 Groups attended    Pt present in all groups with continued appropriate mood, initiation and confidence to share aloud, and improved comfort level sharing more personal examples for the good of the group. Pt with G understanding of happiness education as pt able to provide a scenario of how she knew she was not happy \"my former boyfriend cheated on me and it made me look at myself as if it were my fault\" Pt continues to provide improved insight to this stressor acknowledging \"I know it wasnt my fault and I need to work on myself\" During SUZANNE group, pt remains receptive of education and handouts provided as well as offers multiple appropriate suggestions to improve peers stay for the future. Pt brighter in affect 100% of the time during same group and continues to make improvement toward OT goals as evidenced above. Pt would benefit from continued OT services in order to improve overall self-esteem, personal confidence and positive supports for safe transition at discharge.      Encounter Problems       Encounter Problems (Active)       OT Goals       Pt will ID 2 community resources/programs to join/attend after D/C to improve their support system.  (Progressing)       Start:  05/27/24    Expected End:  06/10/24            Pt will demo/ID 2-3 ways to improve effectiveness in completing tasks and responsibilities, while focusing attention on the present.   (Progressing)       Start:  05/27/24    Expected End:  06/10/24            Pt will explore and ID 1-2 strategies to manage stressors/symptoms of illness/ grief more effectively prior to discharge.   (Progressing)       Start:  05/27/24    Expected End:  06/10/24  "           Pt will explore and ID 1-2 effective methods of expressing feelings, want and needs prior to discharge.   (Progressing)       Start:  05/27/24    Expected End:  06/10/24                         Additional Comment:  DUQUE collaborated with patients nurse and charge nurse throughout the day to provide appropriate support and encouragement to attend groups. Pt up on unit when DUQUE left last group of the day. All needs met.

## 2024-06-10 NOTE — GROUP NOTE
"Group Topic: Goals   Group Date: 6/10/2024  Start Time: 0730  End Time: 0800  Facilitators: MINA Negro   Department: [facility] REHAB THERAPY VIRTUAL    Number of Participants: 6   Group Focus: daily focus, goals, and personal responsibility  Treatment Modality: Other: Recreational Therapy   Interventions utilized were exploration and support  Purpose: other: smart goals, daily focus, healthy habits     Name: Ruthy Mcrae YOB: 1995   MR: [id number]      Facilitator: Recreational Therapist  Level of Participation: active  Quality of Participation: appropriate/pleasant, cooperative, and engaged  Interactions with others: appropriate  Mood/Affect: appropriate and positive  Triggers (if applicable): n/a  Cognition: coherent/clear  Progress: Moderate  Comments: Patients were provided with a SMART Goals worksheet for morning goal session (specific, measurable, attainable, relevant, and timely). We discussed setting goals and challenging ourselves to make healthy lifestyle changes.    Pt was calm and cooperative this morning. She completed goal worksheet, identifying that she would like to work on \"staying hydrated and eating 3 round meals each day\". She is looking forward to discharge today.     Plan: continue with services      "

## 2024-06-10 NOTE — DISCHARGE SUMMARY
Discharge Summary      Reason For Admission: panic attacks, severe ruminations, increasing depressive features  Discharge Destination: home    Discharge Diagnosis:  Bipolar 2, recurrent depression, severe, without psychosis  Panic attacks    COLLIN  OCD     HISTORY OF PRESENT ILLNESS:  Patient is a 27yo  female with psychiatric hx of depression, anxiety, OCD who presented to emergency department 5/24/24 for worsening of depression and SI with a method of overdosing on her medication. She is from [place] and grew up here so came back home to see her parents 1 week ago due to severe depression. Pt is medically cleared and admitted to [facility] for further psychiatric evaluation and stabilization.    On evaluation at [facility] 5/25/24, Pt endorses “struggling with increased depression” and “suicidal ideation” with plan to overdose but without intention and she wants to get help and “I came in because current medications not working and medications need adjusted. Pt reports it started about 6 months ago when she realized that her partner cheated on her. She states she is having “post infidelity stress symptoms” including “flashbacks of it all, ruminating thoughts, breakdown in my self-esteem and hopelessness.” She reports becoming increasingly depressed over the past two months with intensified symptoms over the past week. Pt endorse depressed mood, poor appetite, decreased energy, decreased concentration, trouble with sleep, increased feelings of hopelessness and helplessness and SI with plan to overdose. She also reports experiencing anxiety, excessive worries about her life, rumination. She reports obsessive thoughts about her appearance/how people are treating her, paranoid thoughts about “what's happening and being said behind my back. Pt reports constantly crying while awake, barely eating and is not caring for herself. Patient has not been able to work and she came to [place] to be with her parents to seek  help. Patient sent texts to her family prior to coming to ED 5/24/24 disclosing her thoughts of “hopelessness and not wanting to be alive,” which prompted her to come to the ED.  Pt denies any suicide attempt in the past, pt denies any family hx of suicide attempt. Pt denies manic symptoms. Pt denies use of alcohol or illicit drugs. Pt denies any guns at home.       Per EPAT 5/24/24   Patient is a 27yo  female presenting to the ED due to SI. Patient's provider note and community record was reviewed. Patient's chart hx is limited, as she is a resident of [place], [place]. She reported that she is currently visiting her parents for support as a result of “a breakdown with my mental health.” She endorsed SI with a method of overdosing on her medication, but denied having intent. She was recently hospitalized in April at a psychiatric facility in [place] due to SI and ingesting too much of her medication “to sleep,” denying that this was an attempt of suicide. Patient is currently working with a psychiatrist in [place] and identified that she has been diagnosed with OCD and depression r/o BPD. Patient denied HI/AH/VH.    Hospital course:  Patient was trialed on low dose Zyprexa, with luvox on admission. This medication regimen was changed due to daytime sedation and drug interaction with luvox and other medication. Patient was switched to Abilify and Cymbalta. Cogentin was added to address quivering upper lip, eps. Sleep improved with sleep aids and added xanax 1mg at bedtime. Patient worked on coping skills and cbt. The patient was seen daily by the team, which included the provider, nursing, occupational therapy and social work. Patient received education regarding their diagnosis and treatment plan. Patient was visible on the unit, medication compliant, cooperative  with care and help seeking. The patient attended group therapy, worked on individualized coping skills and was goal oriented to the future and  "to ongoing stabilization of their mental health needs.       MMSE:                                                                                                                       General:   Appearance: appropriate grooming and hygiene, appears stated age  Attitude:  calm, cooperative  Behavior: appropriate eye contact  Movement: no psychomotor agitation or retardation. No EPS/TD. Normal gait and station. Normal muscle tone/bulk.  Speech and language:  regular rate, rhythm, volume and tone, spontaneous, fluent.   Mood: \"tter\"  Affect:  euthymic, full range, affect/mood congruent  Thought process: linear, organized, logical, goal directed thinking and processing  Thought content: does not endorse suicidal ideation or homicidal ideations, no delusions elicited.  Perception: does not endorse auditory/visual hallucinations. Does not appear to be responding to hallucinatory stimuli, no olfactory, somatic or tactile hallucinations were appreciated.  Cognition:  alert and oriented to person, place, time and purpose, short and long term memory within normal limits, attention and concentration within normal limits  Insight:  fair, as patient recognizes symptoms of illness and need for recommended treatments.   Judgment:  can make reasonable decisions about ordinary activities of daily living and necessary medical care recommendations    LABS   ECG 12 lead    Result Date: 5/29/2024  Normal sinus rhythm with sinus arrhythmia Normal ECG No previous ECGs available See ED provider note for full interpretation and clinical correlation Confirmed by Danni Thakur ([id number]) on 5/29/2024 8:07:58 PM    FUNCTIONAL ESTIMATES  Estimate of Intelligence: above average   Estimate of Capacity for Activities of Daily Living:   independent     A safety risk assessment was completed on the day of discharge. The patient is judged a minimal suicide risk due to:  1)  No access to guns.  2) Denied prior suicide attempts  3) Denies current " suicidal ideation or plans  4) Goal directed to the future: get better  5) No current symptoms of a major depressive episode.  The team deemed the patient to be a low risk of self harm and recommend the patient for discharge today.    I spent over 30 minutes in the preparation of this summary. All 11 elements of the transition record were discussed with the patient and or caregiver and the receiving inpatient facility (if applicable).  A copy of the transition record was given to the patient and was transmitted to the outpatient provider accepting the patient's care following  discharge.    Patient's illness, medication/potential for medication side effects, and the medication recommended along with the importance of mediation compliance benefits and risk were reviewed prior to discharge with the patient and with designated family member patient (if applicable).  The patient voiced understanding of their diagnosis and treatment plan.  The patient was counseled not to stop medications without the supervision of a psychiatrist.  The patient was counseled to follow-up with their outpatient medical provider as indicated.   The patient was counseled that if there was an increase in mental health issues, depression, anxiety, medication side effects, self harming thoughts or thoughts to harm others, to call [facility] or 911 and come to the nearest emergency room.   The patient also received information regarding advanced mental and medical health directives during this hospitalization which they could discuss with their outpatient provider.   The plan was discussed with the patient, the nurse and the social work department. The patient voiced understanding and agreement with the plan.     these medications from [id number] - [facility], [place] - [address] AT [facility]  Alprazolam  Ariprazole  benztropine  Duloxetine  trazodone     Medications on Discharge:  ALPRAZolam, 1 mg, oral,  Nightly  ARIPiprazole, 7.5 mg, oral, q24h  bacitracin, 1 Application, Topical, TID  benztropine, 0.75 mg, oral, Daily  DULoxetine, 60 mg, oral, Daily  norethindrone-e.estradioL-iron, 1 tablet, oral, Daily  sodium chloride, 1,000 mL, intravenous, Once  traZODone, 300 mg, oral, Nightly    Medication to Stop:  STOP taking:  cloNIDine 0.1 mg tablet (Catapres)   escitalopram 20 mg tablet (Lexapro)   mirtazapine 15 mg tablet (Remeron)   risperiDONE 0.5 mg tablet (RisperDAL)       Follow up appointments:  What's Next  What's Next           Follow up with [facility]  [address] [place] [address]   [facility] [id number]  Additional Information     Follow Up Outpatient Mental Health Services:     Walk-In Assessment for PHP/IOP Services.  On   Tuesday, June 11, 2024 at 9:00 AM  At [facility],  [address] [place], [place] [id number].  P. [phone];  F. [phone];     2.    Individual Therapy, CBT (cognitive behavioral therapy) with Hansa Dodson,  On Tuesday, June 18th, 2024 at 8:00 AM  And   3.    Psychiatry Assessment with Lynne Parekh  On Wednesday, June 19th, 2024 at 8:00 AM     BOTH At   [facility],  [address] [place] [address] [address],   [place] [id number].     P. [phone];  F [phone];        Community Resources  Crisis Center Hotline:  National Suicide  & Crisis Prevention Lifeline: Call or Text 098 or 1-665.194.6141 (TALK)  Crisis Text Line: Text HOME to 211259       Brittani Bernabe MD

## 2024-06-12 LAB
ATRIAL RATE: 80 BPM
P AXIS: 58 DEGREES
P OFFSET: 204 MS
P ONSET: 146 MS
PR INTERVAL: 146 MS
Q ONSET: 219 MS
QRS COUNT: 13 BEATS
QRS DURATION: 88 MS
QT INTERVAL: 382 MS
QTC CALCULATION(BAZETT): 440 MS
QTC FREDERICIA: 420 MS
R AXIS: 34 DEGREES
T AXIS: 12 DEGREES
T OFFSET: 410 MS
VENTRICULAR RATE: 80 BPM

## 2024-06-12 NOTE — SIGNIFICANT EVENT
Follow Up Phone Call    Outgoing phone call    Spoke to: Ruthy Mcrae Relationship:self   Phone number: [phone]      Outcome: I left a message on answering machine   No chief complaint on file.         Diagnosis:Not applicable